# Patient Record
Sex: MALE | Race: WHITE | NOT HISPANIC OR LATINO | Employment: OTHER
[De-identification: names, ages, dates, MRNs, and addresses within clinical notes are randomized per-mention and may not be internally consistent; named-entity substitution may affect disease eponyms.]

---

## 2017-01-09 ENCOUNTER — PRIOR ORIGINAL RECORDS (OUTPATIENT)
Dept: OTHER | Age: 72
End: 2017-01-09

## 2017-06-01 ENCOUNTER — PRIOR ORIGINAL RECORDS (OUTPATIENT)
Dept: OTHER | Age: 72
End: 2017-06-01

## 2017-07-13 ENCOUNTER — HOSPITAL (OUTPATIENT)
Dept: OTHER | Age: 72
End: 2017-07-13
Attending: INTERNAL MEDICINE

## 2017-07-13 ENCOUNTER — CHARTING TRANS (OUTPATIENT)
Dept: OTHER | Age: 72
End: 2017-07-13

## 2017-07-13 LAB
ALBUMIN SERPL-MCNC: 3.5 GM/DL (ref 3.6–5.1)
ALBUMIN/GLOB SERPL: 0.7 {RATIO} (ref 1–2.4)
ALP SERPL-CCNC: 127 UNIT/L (ref 45–117)
ALT SERPL-CCNC: 45 UNIT/L
ANALYZER ANC (IANC): ABNORMAL
ANION GAP SERPL CALC-SCNC: 15 MMOL/L (ref 10–20)
AST SERPL-CCNC: 28 UNIT/L
BASOPHILS # BLD: 0 THOUSAND/MCL (ref 0–0.3)
BASOPHILS NFR BLD: 0 %
BILIRUB SERPL-MCNC: 0.7 MG/DL (ref 0.2–1)
BNP SERPL-MCNC: 193 PG/ML
BUN SERPL-MCNC: 24 MG/DL (ref 6–20)
BUN/CREAT SERPL: 29 (ref 7–25)
CALCIUM SERPL-MCNC: 9 MG/DL (ref 8.4–10.2)
CHLORIDE: 106 MMOL/L (ref 98–107)
CO2 SERPL-SCNC: 25 MMOL/L (ref 21–32)
CREAT SERPL-MCNC: 0.82 MG/DL (ref 0.67–1.17)
DIFFERENTIAL METHOD BLD: ABNORMAL
EOSINOPHIL # BLD: 0.1 THOUSAND/MCL (ref 0.1–0.5)
EOSINOPHIL NFR BLD: 1 %
ERYTHROCYTE [DISTWIDTH] IN BLOOD: 13.7 % (ref 11–15)
GLOBULIN SER-MCNC: 4.8 GM/DL (ref 2–4)
GLUCOSE SERPL-MCNC: 147 MG/DL (ref 65–99)
HEMATOCRIT: 39 % (ref 39–51)
HGB BLD-MCNC: 13.3 GM/DL (ref 13–17)
LIPASE SERPL-CCNC: 191 UNIT/L (ref 73–393)
LYMPHOCYTES # BLD: 0.9 THOUSAND/MCL (ref 1–4)
LYMPHOCYTES NFR BLD: 11 %
MCH RBC QN AUTO: 31.7 PG (ref 26–34)
MCHC RBC AUTO-ENTMCNC: 34.1 GM/DL (ref 32–36.5)
MCV RBC AUTO: 93.1 FL (ref 78–100)
MONOCYTES # BLD: 1.4 THOUSAND/MCL (ref 0.3–0.9)
MONOCYTES NFR BLD: 18 %
NEUTROPHILS # BLD: 5.7 THOUSAND/MCL (ref 1.8–7.7)
NEUTROPHILS NFR BLD: 70 %
NEUTS SEG NFR BLD: ABNORMAL %
PERCENT NRBC: ABNORMAL
PLATELET # BLD: 294 THOUSAND/MCL (ref 140–450)
POTASSIUM SERPL-SCNC: 3.6 MMOL/L (ref 3.4–5.1)
PROT SERPL-MCNC: 8.3 GM/DL (ref 6.4–8.2)
RBC # BLD: 4.19 MILLION/MCL (ref 4.5–5.9)
SODIUM SERPL-SCNC: 142 MMOL/L (ref 135–145)
TROPONIN I SERPL HS-MCNC: <0.02 NG/ML
WBC # BLD: 8.1 THOUSAND/MCL (ref 4.2–11)

## 2017-07-14 ENCOUNTER — CHARTING TRANS (OUTPATIENT)
Dept: OTHER | Age: 72
End: 2017-07-14

## 2017-07-14 ENCOUNTER — DIAGNOSTIC TRANS (OUTPATIENT)
Dept: OTHER | Age: 72
End: 2017-07-14

## 2017-07-14 LAB
ALBUMIN SERPL-MCNC: 2.6 GM/DL (ref 3.6–5.1)
ALBUMIN/GLOB SERPL: 0.7 {RATIO} (ref 1–2.4)
ALP SERPL-CCNC: 57 UNIT/L (ref 45–117)
ALT SERPL-CCNC: 29 UNIT/L
ANALYZER ANC (IANC): ABNORMAL
ANION GAP SERPL CALC-SCNC: 13 MMOL/L (ref 10–20)
AST SERPL-CCNC: 21 UNIT/L
BILIRUB SERPL-MCNC: 0.5 MG/DL (ref 0.2–1)
BUN SERPL-MCNC: 24 MG/DL (ref 6–20)
BUN/CREAT SERPL: 29 (ref 7–25)
CALCIUM SERPL-MCNC: 8 MG/DL (ref 8.4–10.2)
CHLORIDE: 110 MMOL/L (ref 98–107)
CHOLEST SERPL-MCNC: 169 MG/DL
CHOLEST/HDLC SERPL: 2.5 {RATIO}
CO2 SERPL-SCNC: 26 MMOL/L (ref 21–32)
CREAT SERPL-MCNC: 0.84 MG/DL (ref 0.67–1.17)
ERYTHROCYTE [DISTWIDTH] IN BLOOD: 13.9 % (ref 11–15)
GLOBULIN SER-MCNC: 3.8 GM/DL (ref 2–4)
GLUCOSE SERPL-MCNC: 93 MG/DL (ref 65–99)
HDLC SERPL-MCNC: 67 MG/DL
HEMATOCRIT: 32 % (ref 39–51)
HGB BLD-MCNC: 10.8 GM/DL (ref 13–17)
LDLC SERPL CALC-MCNC: 93 MG/DL
MCH RBC QN AUTO: 31.2 PG (ref 26–34)
MCHC RBC AUTO-ENTMCNC: 33.8 GM/DL (ref 32–36.5)
MCV RBC AUTO: 92.5 FL (ref 78–100)
NONHDLC SERPL-MCNC: 102 MG/DL
PLATELET # BLD: 234 THOUSAND/MCL (ref 140–450)
POTASSIUM SERPL-SCNC: 3.7 MMOL/L (ref 3.4–5.1)
PROT SERPL-MCNC: 6.4 GM/DL (ref 6.4–8.2)
RBC # BLD: 3.46 MILLION/MCL (ref 4.5–5.9)
SODIUM SERPL-SCNC: 145 MMOL/L (ref 135–145)
TRIGLYCERIDE (TRIGP): 44 MG/DL
WBC # BLD: 7.1 THOUSAND/MCL (ref 4.2–11)

## 2017-07-21 ENCOUNTER — PRIOR ORIGINAL RECORDS (OUTPATIENT)
Dept: OTHER | Age: 72
End: 2017-07-21

## 2017-12-04 ENCOUNTER — PRIOR ORIGINAL RECORDS (OUTPATIENT)
Dept: OTHER | Age: 72
End: 2017-12-04

## 2018-01-09 ENCOUNTER — PRIOR ORIGINAL RECORDS (OUTPATIENT)
Dept: OTHER | Age: 73
End: 2018-01-09

## 2018-01-11 ENCOUNTER — PRIOR ORIGINAL RECORDS (OUTPATIENT)
Dept: OTHER | Age: 73
End: 2018-01-11

## 2018-01-30 ENCOUNTER — PRIOR ORIGINAL RECORDS (OUTPATIENT)
Dept: OTHER | Age: 73
End: 2018-01-30

## 2018-04-11 ENCOUNTER — MYAURORA ACCOUNT LINK (OUTPATIENT)
Dept: OTHER | Age: 73
End: 2018-04-11

## 2018-04-11 ENCOUNTER — PRIOR ORIGINAL RECORDS (OUTPATIENT)
Dept: OTHER | Age: 73
End: 2018-04-11

## 2018-08-07 ENCOUNTER — MYAURORA ACCOUNT LINK (OUTPATIENT)
Dept: OTHER | Age: 73
End: 2018-08-07

## 2018-08-07 ENCOUNTER — PRIOR ORIGINAL RECORDS (OUTPATIENT)
Dept: OTHER | Age: 73
End: 2018-08-07

## 2019-02-28 VITALS
BODY MASS INDEX: 24.1 KG/M2 | SYSTOLIC BLOOD PRESSURE: 112 MMHG | HEART RATE: 64 BPM | WEIGHT: 159 LBS | HEIGHT: 68 IN | DIASTOLIC BLOOD PRESSURE: 80 MMHG | RESPIRATION RATE: 16 BRPM

## 2019-02-28 VITALS
DIASTOLIC BLOOD PRESSURE: 70 MMHG | SYSTOLIC BLOOD PRESSURE: 112 MMHG | WEIGHT: 162 LBS | BODY MASS INDEX: 24.55 KG/M2 | HEIGHT: 68 IN | HEART RATE: 68 BPM

## 2019-02-28 VITALS
BODY MASS INDEX: 24.25 KG/M2 | SYSTOLIC BLOOD PRESSURE: 112 MMHG | HEART RATE: 60 BPM | WEIGHT: 160 LBS | DIASTOLIC BLOOD PRESSURE: 70 MMHG | RESPIRATION RATE: 16 BRPM | HEIGHT: 68 IN

## 2019-03-01 VITALS
SYSTOLIC BLOOD PRESSURE: 124 MMHG | DIASTOLIC BLOOD PRESSURE: 82 MMHG | RESPIRATION RATE: 16 BRPM | WEIGHT: 160 LBS | BODY MASS INDEX: 24.25 KG/M2 | HEIGHT: 68 IN | HEART RATE: 76 BPM

## 2019-03-19 RX ORDER — RIVAROXABAN 20 MG/1
TABLET, FILM COATED ORAL
COMMUNITY
End: 2019-09-24 | Stop reason: SDUPTHER

## 2019-03-19 RX ORDER — CARVEDILOL 25 MG/1
TABLET ORAL
COMMUNITY
End: 2019-04-16 | Stop reason: SDUPTHER

## 2019-04-16 RX ORDER — CARVEDILOL 25 MG/1
25 TABLET ORAL 2 TIMES DAILY WITH MEALS
Qty: 180 TABLET | Refills: 2 | Status: SHIPPED | OUTPATIENT
Start: 2019-04-16 | End: 2019-04-18 | Stop reason: SDUPTHER

## 2019-04-17 ENCOUNTER — OFFICE VISIT (OUTPATIENT)
Dept: CARDIOLOGY | Age: 74
End: 2019-04-17

## 2019-04-17 VITALS
DIASTOLIC BLOOD PRESSURE: 76 MMHG | RESPIRATION RATE: 16 BRPM | HEIGHT: 69 IN | SYSTOLIC BLOOD PRESSURE: 120 MMHG | BODY MASS INDEX: 23.85 KG/M2 | WEIGHT: 161 LBS

## 2019-04-17 DIAGNOSIS — I77.1 CELIAC ARTERY STENOSIS (CMD): Primary | ICD-10-CM

## 2019-04-17 DIAGNOSIS — Z79.01 CHRONIC ANTICOAGULATION: ICD-10-CM

## 2019-04-17 PROCEDURE — 99213 OFFICE O/P EST LOW 20 MIN: CPT | Performed by: INTERNAL MEDICINE

## 2019-04-17 SDOH — HEALTH STABILITY: PHYSICAL HEALTH: ON AVERAGE, HOW MANY DAYS PER WEEK DO YOU ENGAGE IN MODERATE TO STRENUOUS EXERCISE (LIKE A BRISK WALK)?: 0 DAYS

## 2019-04-17 SDOH — HEALTH STABILITY: PHYSICAL HEALTH: ON AVERAGE, HOW MANY MINUTES DO YOU ENGAGE IN EXERCISE AT THIS LEVEL?: 0 MIN

## 2019-04-17 ASSESSMENT — PATIENT HEALTH QUESTIONNAIRE - PHQ9
1. LITTLE INTEREST OR PLEASURE IN DOING THINGS: NOT AT ALL
2. FEELING DOWN, DEPRESSED OR HOPELESS: NOT AT ALL
SUM OF ALL RESPONSES TO PHQ9 QUESTIONS 1 AND 2: 0
SUM OF ALL RESPONSES TO PHQ9 QUESTIONS 1 AND 2: 0

## 2019-04-17 ASSESSMENT — ENCOUNTER SYMPTOMS
COUGH: 0
BRUISES/BLEEDS EASILY: 0
ALLERGIC/IMMUNOLOGIC COMMENTS: NO NEW FOOD ALLERGIES
CHILLS: 0
WEIGHT GAIN: 0
BACK PAIN: 1
HEMOPTYSIS: 0
FEVER: 0
WEIGHT LOSS: 0
SUSPICIOUS LESIONS: 0
HEMATOCHEZIA: 0

## 2019-04-18 RX ORDER — CARVEDILOL 25 MG/1
25 TABLET ORAL 2 TIMES DAILY WITH MEALS
Qty: 180 TABLET | Refills: 0 | Status: SHIPPED | OUTPATIENT
Start: 2019-04-18 | End: 2019-12-19 | Stop reason: SDUPTHER

## 2019-05-11 PROBLEM — I48.20 CHRONIC ATRIAL FIBRILLATION (CMD): Status: ACTIVE | Noted: 2017-01-09

## 2019-05-11 PROBLEM — I77.4 CELIAC ARTERY STENOSIS (CMD): Status: ACTIVE | Noted: 2019-05-11

## 2019-05-11 PROBLEM — I73.89 OTHER SPECIFIED PERIPHERAL VASCULAR DISEASES (CMD): Status: ACTIVE | Noted: 2017-12-04

## 2019-05-11 PROBLEM — I77.1 CELIAC ARTERY STENOSIS (CMD): Status: ACTIVE | Noted: 2019-05-11

## 2019-08-22 ENCOUNTER — OFFICE VISIT (OUTPATIENT)
Dept: CARDIOLOGY | Age: 74
End: 2019-08-22

## 2019-08-22 VITALS
HEIGHT: 68 IN | SYSTOLIC BLOOD PRESSURE: 118 MMHG | BODY MASS INDEX: 23.64 KG/M2 | HEART RATE: 76 BPM | WEIGHT: 156 LBS | DIASTOLIC BLOOD PRESSURE: 82 MMHG

## 2019-08-22 DIAGNOSIS — E78.2 HYPERLIPIDEMIA, MIXED: ICD-10-CM

## 2019-08-22 DIAGNOSIS — I45.10 RBBB: ICD-10-CM

## 2019-08-22 DIAGNOSIS — I35.1 MILD AORTIC REGURGITATION: ICD-10-CM

## 2019-08-22 DIAGNOSIS — I73.89 OTHER SPECIFIED PERIPHERAL VASCULAR DISEASES (CMD): ICD-10-CM

## 2019-08-22 DIAGNOSIS — I10 ESSENTIAL HYPERTENSION: ICD-10-CM

## 2019-08-22 DIAGNOSIS — I77.1 CELIAC ARTERY STENOSIS (CMD): ICD-10-CM

## 2019-08-22 DIAGNOSIS — Z79.01 LONG TERM (CURRENT) USE OF ANTICOAGULANTS: ICD-10-CM

## 2019-08-22 DIAGNOSIS — I34.0 MILD MITRAL REGURGITATION: ICD-10-CM

## 2019-08-22 DIAGNOSIS — I48.20 CHRONIC ATRIAL FIBRILLATION (CMD): Primary | ICD-10-CM

## 2019-08-22 DIAGNOSIS — R79.82 ELEVATED C-REACTIVE PROTEIN (CRP): ICD-10-CM

## 2019-08-22 PROBLEM — I73.9 SMALL VESSEL DISEASE (HCC): Status: ACTIVE | Noted: 2019-08-22

## 2019-08-22 PROBLEM — R26.9 GAIT ABNORMALITY: Status: ACTIVE | Noted: 2019-08-22

## 2019-08-22 PROBLEM — G95.89 MYELOMALACIA (HCC): Status: ACTIVE | Noted: 2019-08-22

## 2019-08-22 PROBLEM — M48.02 CERVICAL STENOSIS OF SPINAL CANAL: Status: ACTIVE | Noted: 2019-08-22

## 2019-08-22 PROCEDURE — 3079F DIAST BP 80-89 MM HG: CPT | Performed by: INTERNAL MEDICINE

## 2019-08-22 PROCEDURE — 3074F SYST BP LT 130 MM HG: CPT | Performed by: INTERNAL MEDICINE

## 2019-08-22 PROCEDURE — 99214 OFFICE O/P EST MOD 30 MIN: CPT | Performed by: INTERNAL MEDICINE

## 2019-08-22 SDOH — HEALTH STABILITY: PHYSICAL HEALTH: ON AVERAGE, HOW MANY MINUTES DO YOU ENGAGE IN EXERCISE AT THIS LEVEL?: PATIENT DECLINED

## 2019-08-22 SDOH — HEALTH STABILITY: PHYSICAL HEALTH
ON AVERAGE, HOW MANY DAYS PER WEEK DO YOU ENGAGE IN MODERATE TO STRENUOUS EXERCISE (LIKE A BRISK WALK)?: PATIENT DECLINED

## 2019-08-22 ASSESSMENT — ENCOUNTER SYMPTOMS
BRUISES/BLEEDS EASILY: 0
HEMOPTYSIS: 0
WEIGHT GAIN: 0
CHILLS: 0
SUSPICIOUS LESIONS: 0
FEVER: 0
HEMATOCHEZIA: 0
ALLERGIC/IMMUNOLOGIC COMMENTS: NO NEW FOOD ALLERGIES
COUGH: 0
WEIGHT LOSS: 0

## 2019-09-09 ENCOUNTER — TELEPHONE (OUTPATIENT)
Dept: CARDIOLOGY | Age: 74
End: 2019-09-09

## 2019-09-10 ENCOUNTER — ANCILLARY PROCEDURE (OUTPATIENT)
Dept: CARDIOLOGY | Age: 74
End: 2019-09-10
Attending: INTERNAL MEDICINE

## 2019-09-10 VITALS — WEIGHT: 156 LBS | BODY MASS INDEX: 23.64 KG/M2 | HEIGHT: 68 IN

## 2019-09-10 DIAGNOSIS — I48.20 CHRONIC ATRIAL FIBRILLATION (CMD): ICD-10-CM

## 2019-09-10 DIAGNOSIS — R79.82 ELEVATED C-REACTIVE PROTEIN (CRP): ICD-10-CM

## 2019-09-10 LAB
HEART RATE RESERVE PREDICTED: 27.7 BPM
LV EF: 51 %
PEAK HR ACHIEVED: 107 BPM
RESTING HR ACHIEVED: 75 BPM
STRESS BASELINE BP: NORMAL MMHG
STRESS PERCENT HR: 72 %
STRESS POST EXERCISE DUR MIN: 4 MIN
STRESS POST EXERCISE DUR SEC: 5 SEC
STRESS POST PEAK BP: NORMAL MMHG
STRESS TARGET HR: 148 BPM

## 2019-09-10 PROCEDURE — 93015 CV STRESS TEST SUPVJ I&R: CPT | Performed by: INTERNAL MEDICINE

## 2019-09-10 PROCEDURE — A9502 TC99M TETROFOSMIN: HCPCS | Performed by: INTERNAL MEDICINE

## 2019-09-10 PROCEDURE — 78452 HT MUSCLE IMAGE SPECT MULT: CPT | Performed by: INTERNAL MEDICINE

## 2019-09-10 RX ORDER — REGADENOSON 0.08 MG/ML
0.4 INJECTION, SOLUTION INTRAVENOUS ONCE
Status: COMPLETED | OUTPATIENT
Start: 2019-09-10 | End: 2019-09-10

## 2019-09-10 RX ADMIN — REGADENOSON 0.4 MG: 0.08 INJECTION, SOLUTION INTRAVENOUS at 14:05

## 2019-09-10 ASSESSMENT — EXERCISE STRESS TEST
PEAK_HR: 83
STAGE_CATEGORIES: RECOVERY 1
PEAK_HR: 107
STAGE_CATEGORIES: RESTING
PEAK_BP: 120/70
PEAK_HR: 107
PEAK_BP: 128/78
PEAK_RPP: 11264
STAGE_CATEGORIES: RECOVERY 2
PEAK_HR: 109
PEAK_HR: 75
PEAK_BP: 114/70
PEAK_HR: 88
STAGE_CATEGORIES: RECOVERY 0
PEAK_RPP: 13080
PEAK_BP: 114/70
PEAK_RPP: 9150
STAGE_CATEGORIES: 2
PEAK_BP: 124/66
STAGE_CATEGORIES: 1
PEAK_RPP: 12198
PEAK_BP: 122/78
PEAK_RPP: 10292
PEAK_RPP: 12198

## 2019-09-11 ENCOUNTER — TELEPHONE (OUTPATIENT)
Dept: CARDIOLOGY | Age: 74
End: 2019-09-11

## 2019-09-12 ENCOUNTER — TELEPHONE (OUTPATIENT)
Dept: CARDIOLOGY | Age: 74
End: 2019-09-12

## 2019-09-26 RX ORDER — RIVAROXABAN 20 MG/1
20 TABLET, FILM COATED ORAL
Qty: 90 TABLET | Refills: 3 | Status: SHIPPED | OUTPATIENT
Start: 2019-09-26 | End: 2021-03-16 | Stop reason: SDUPTHER

## 2019-12-17 LAB
ALT SERPL-CCNC: 26 U/L
AST SERPL-CCNC: 36 U/L
CHOLEST SERPL-MCNC: 206 MG/DL
CHOLEST/HDLC SERPL: NORMAL {RATIO}
HDLC SERPL-MCNC: 47 MG/DL
LDLC SERPL CALC-MCNC: 146 MG/DL
LENGTH OF FAST TIME PATIENT: NORMAL H
NONHDLC SERPL-MCNC: NORMAL MG/DL
TRIGL SERPL-MCNC: 67 MG/DL
VLDLC SERPL CALC-MCNC: 13 MG/DL

## 2019-12-18 ENCOUNTER — TELEPHONE (OUTPATIENT)
Dept: CARDIOLOGY | Age: 74
End: 2019-12-18

## 2019-12-18 ENCOUNTER — CLINICAL ABSTRACT (OUTPATIENT)
Dept: CARDIOLOGY | Age: 74
End: 2019-12-18

## 2019-12-18 DIAGNOSIS — E78.5 DYSLIPIDEMIA: Primary | ICD-10-CM

## 2019-12-19 RX ORDER — CARVEDILOL 25 MG/1
25 TABLET ORAL 2 TIMES DAILY WITH MEALS
Qty: 180 TABLET | Refills: 1 | Status: SHIPPED | OUTPATIENT
Start: 2019-12-19 | End: 2020-09-14 | Stop reason: SDUPTHER

## 2020-04-22 ENCOUNTER — APPOINTMENT (OUTPATIENT)
Dept: CARDIOLOGY | Age: 75
End: 2020-04-22

## 2020-04-24 ENCOUNTER — OFFICE VISIT (OUTPATIENT)
Dept: CARDIOLOGY | Age: 75
End: 2020-04-24

## 2020-04-24 DIAGNOSIS — I48.20 CHRONIC ATRIAL FIBRILLATION (CMD): Primary | ICD-10-CM

## 2020-04-24 DIAGNOSIS — I77.1 CELIAC ARTERY STENOSIS (CMD): ICD-10-CM

## 2020-04-24 DIAGNOSIS — I73.89 OTHER SPECIFIED PERIPHERAL VASCULAR DISEASES (CMD): ICD-10-CM

## 2020-04-24 DIAGNOSIS — E78.5 DYSLIPIDEMIA: ICD-10-CM

## 2020-04-24 DIAGNOSIS — I10 ESSENTIAL HYPERTENSION: ICD-10-CM

## 2020-04-24 DIAGNOSIS — I34.0 MILD MITRAL REGURGITATION: ICD-10-CM

## 2020-07-02 LAB
25(OH)D3+25(OH)D2 SERPL-MCNC: 60.3 NG/ML
ALBUMIN SERPL-MCNC: 4.3 G/DL
ALBUMIN/GLOB SERPL: 1.7 {RATIO}
ALP SERPL-CCNC: 75 U/L
ALT SERPL-CCNC: 31 UNITS/L
AST SERPL-CCNC: 36 UNITS/L
BILIRUB SERPL-MCNC: 0.4 MG/DL
BUN SERPL-MCNC: 15 MG/DL
BUN/CREAT SERPL: 14
CALCIUM SERPL-MCNC: 9.2 MG/DL
CHLORIDE SERPL-SCNC: 101 MMOL/L
CHOLEST SERPL-MCNC: 216 MG/DL
CHOLEST/HDLC SERPL: 4.1 {RATIO}
CO2 SERPL-SCNC: 22 MMOL/L
CREAT SERPL-MCNC: 1.07 MG/DL
ERYTHROCYTE [DISTWIDTH] IN BLOOD: 12.9 %
GLOBULIN SER-MCNC: 2.5 G/DL
GLUCOSE SERPL-MCNC: 94 MG/DL
HCT VFR BLD CALC: 35.3 %
HDLC SERPL-MCNC: 53 MG/DL
HGB BLD-MCNC: 11.7 G/DL
LDLC SERPL CALC-MCNC: 151 MG/DL
MCH RBC QN AUTO: 30.8 PG
MCHC RBC AUTO-ENTMCNC: 33.1 G/DL
MCV RBC AUTO: 93 FL
PLATELET # BLD: 270 K/MCL
POTASSIUM SERPL-SCNC: 4.6 MMOL/L
PROT SERPL-MCNC: 6.8 G/DL
RBC # BLD: 3.8 10*6/UL
SODIUM SERPL-SCNC: 139 MMOL/L
TRIGL SERPL-MCNC: 62 MG/DL
VLDLC SERPL CALC-MCNC: 12 MG/DL
WBC # BLD: 5.6 K/MCL

## 2020-08-20 ENCOUNTER — OFFICE VISIT (OUTPATIENT)
Dept: CARDIOLOGY | Age: 75
End: 2020-08-20

## 2020-08-20 VITALS
HEIGHT: 68 IN | SYSTOLIC BLOOD PRESSURE: 120 MMHG | DIASTOLIC BLOOD PRESSURE: 62 MMHG | BODY MASS INDEX: 24.55 KG/M2 | WEIGHT: 162 LBS | HEART RATE: 76 BPM

## 2020-08-20 DIAGNOSIS — I48.20 CHRONIC ATRIAL FIBRILLATION (CMD): Primary | ICD-10-CM

## 2020-08-20 DIAGNOSIS — I73.89 OTHER SPECIFIED PERIPHERAL VASCULAR DISEASES (CMD): ICD-10-CM

## 2020-08-20 DIAGNOSIS — I73.9 PERIPHERAL VASCULAR DISEASE (CMD): ICD-10-CM

## 2020-08-20 DIAGNOSIS — I10 ESSENTIAL HYPERTENSION: ICD-10-CM

## 2020-08-20 DIAGNOSIS — I45.10 RBBB: ICD-10-CM

## 2020-08-20 DIAGNOSIS — I35.1 MILD AORTIC REGURGITATION: ICD-10-CM

## 2020-08-20 DIAGNOSIS — Z79.01 LONG TERM (CURRENT) USE OF ANTICOAGULANTS: ICD-10-CM

## 2020-08-20 DIAGNOSIS — I34.0 MILD MITRAL REGURGITATION: ICD-10-CM

## 2020-08-20 DIAGNOSIS — E78.5 DYSLIPIDEMIA: ICD-10-CM

## 2020-08-20 DIAGNOSIS — I77.1 CELIAC ARTERY STENOSIS (CMD): ICD-10-CM

## 2020-08-20 PROCEDURE — 3078F DIAST BP <80 MM HG: CPT | Performed by: INTERNAL MEDICINE

## 2020-08-20 PROCEDURE — 3074F SYST BP LT 130 MM HG: CPT | Performed by: INTERNAL MEDICINE

## 2020-08-20 PROCEDURE — 99214 OFFICE O/P EST MOD 30 MIN: CPT | Performed by: INTERNAL MEDICINE

## 2020-08-20 SDOH — HEALTH STABILITY: PHYSICAL HEALTH: ON AVERAGE, HOW MANY DAYS PER WEEK DO YOU ENGAGE IN MODERATE TO STRENUOUS EXERCISE (LIKE A BRISK WALK)?: 4 DAYS

## 2020-08-20 SDOH — HEALTH STABILITY: PHYSICAL HEALTH: ON AVERAGE, HOW MANY MINUTES DO YOU ENGAGE IN EXERCISE AT THIS LEVEL?: 60 MIN

## 2020-08-20 ASSESSMENT — ENCOUNTER SYMPTOMS
HEMOPTYSIS: 0
DEPRESSION: 0
BLURRED VISION: 0
FOCAL WEAKNESS: 0
CHILLS: 0
ALLERGIC/IMMUNOLOGIC COMMENTS: NO NEW FOOD ALLERGIES
WEIGHT LOSS: 0
SHORTNESS OF BREATH: 0
FEVER: 0
WEIGHT GAIN: 0
HEMATOCHEZIA: 0
BRUISES/BLEEDS EASILY: 0
SUSPICIOUS LESIONS: 0
COUGH: 0
NEAR-SYNCOPE: 0
SYNCOPE: 0

## 2020-08-20 ASSESSMENT — PATIENT HEALTH QUESTIONNAIRE - PHQ9
SUM OF ALL RESPONSES TO PHQ9 QUESTIONS 1 AND 2: 0
SUM OF ALL RESPONSES TO PHQ9 QUESTIONS 1 AND 2: 0
CLINICAL INTERPRETATION OF PHQ2 SCORE: NO FURTHER SCREENING NEEDED
CLINICAL INTERPRETATION OF PHQ9 SCORE: NO FURTHER SCREENING NEEDED
2. FEELING DOWN, DEPRESSED OR HOPELESS: NOT AT ALL
1. LITTLE INTEREST OR PLEASURE IN DOING THINGS: NOT AT ALL

## 2020-08-21 ENCOUNTER — CLINICAL ABSTRACT (OUTPATIENT)
Dept: CARDIOLOGY | Age: 75
End: 2020-08-21

## 2020-09-15 RX ORDER — CARVEDILOL 25 MG/1
25 TABLET ORAL 2 TIMES DAILY WITH MEALS
Qty: 180 TABLET | Refills: 3 | Status: SHIPPED | OUTPATIENT
Start: 2020-09-15 | End: 2021-09-16

## 2021-01-01 ENCOUNTER — EXTERNAL RECORD (OUTPATIENT)
Dept: OTHER | Age: 76
End: 2021-01-01

## 2021-01-26 ENCOUNTER — TELEPHONE (OUTPATIENT)
Dept: CARDIOLOGY | Age: 76
End: 2021-01-26

## 2021-02-03 ENCOUNTER — TELEPHONE (OUTPATIENT)
Dept: CARDIOLOGY | Age: 76
End: 2021-02-03

## 2021-02-03 ENCOUNTER — IMAGING SERVICES (OUTPATIENT)
Dept: CARDIOLOGY | Age: 76
End: 2021-02-03
Attending: INTERNAL MEDICINE

## 2021-02-03 ENCOUNTER — ANCILLARY PROCEDURE (OUTPATIENT)
Dept: CARDIOLOGY | Age: 76
End: 2021-02-03
Attending: INTERNAL MEDICINE

## 2021-02-03 DIAGNOSIS — I70.0 AORTIC ATHEROSCLEROSIS (CMD): ICD-10-CM

## 2021-02-03 DIAGNOSIS — I73.9 PERIPHERAL VASCULAR DISEASE (CMD): ICD-10-CM

## 2021-02-11 ENCOUNTER — DOCUMENTATION (OUTPATIENT)
Dept: CARDIOLOGY | Age: 76
End: 2021-02-11

## 2021-02-26 ENCOUNTER — APPOINTMENT (OUTPATIENT)
Dept: CARDIOLOGY | Age: 76
End: 2021-02-26

## 2021-02-26 ENCOUNTER — DOCUMENTATION (OUTPATIENT)
Dept: CARDIOLOGY | Age: 76
End: 2021-02-26

## 2021-03-15 DIAGNOSIS — Z23 NEED FOR VACCINATION: ICD-10-CM

## 2021-03-16 ENCOUNTER — TELEPHONE (OUTPATIENT)
Dept: CARDIOLOGY | Age: 76
End: 2021-03-16

## 2021-03-16 RX ORDER — RIVAROXABAN 20 MG/1
20 TABLET, FILM COATED ORAL
Qty: 90 TABLET | Refills: 3 | Status: SHIPPED | OUTPATIENT
Start: 2021-03-16 | End: 2022-03-21

## 2021-03-16 RX ORDER — RIVAROXABAN 20 MG/1
TABLET, FILM COATED ORAL
Qty: 90 TABLET | Refills: 3 | OUTPATIENT
Start: 2021-03-16

## 2021-03-18 ENCOUNTER — IMMUNIZATION (OUTPATIENT)
Dept: LAB | Age: 76
End: 2021-03-18

## 2021-03-18 DIAGNOSIS — Z23 NEED FOR VACCINATION: Primary | ICD-10-CM

## 2021-03-18 PROCEDURE — 91300 COVID 19 PFIZER-BIONTECH: CPT

## 2021-03-18 PROCEDURE — 0001A COVID 19 PFIZER-BIONTECH: CPT

## 2021-04-12 ENCOUNTER — IMMUNIZATION (OUTPATIENT)
Dept: LAB | Age: 76
End: 2021-04-12
Attending: HOSPITALIST

## 2021-04-12 DIAGNOSIS — Z23 NEED FOR VACCINATION: Primary | ICD-10-CM

## 2021-04-12 PROCEDURE — 0002A COVID 19 PFIZER-BIONTECH: CPT | Performed by: HOSPITALIST

## 2021-04-12 PROCEDURE — 91300 COVID 19 PFIZER-BIONTECH: CPT | Performed by: HOSPITALIST

## 2021-04-15 ENCOUNTER — DOCUMENTATION (OUTPATIENT)
Dept: CARDIOLOGY | Age: 76
End: 2021-04-15

## 2021-04-30 ENCOUNTER — TELEPHONE (OUTPATIENT)
Dept: CARDIOLOGY | Age: 76
End: 2021-04-30

## 2021-05-14 ENCOUNTER — TELEPHONE (OUTPATIENT)
Dept: CARDIOLOGY | Age: 76
End: 2021-05-14

## 2021-05-17 ENCOUNTER — DOCUMENTATION (OUTPATIENT)
Dept: CARDIOLOGY | Age: 76
End: 2021-05-17

## 2021-05-22 ENCOUNTER — LAB ENCOUNTER (OUTPATIENT)
Dept: LAB | Age: 76
End: 2021-05-22
Attending: ORTHOPAEDIC SURGERY
Payer: MEDICARE

## 2021-05-22 DIAGNOSIS — Z01.818 PRE-OP TESTING: ICD-10-CM

## 2021-05-22 PROCEDURE — 86850 RBC ANTIBODY SCREEN: CPT

## 2021-05-22 PROCEDURE — 86900 BLOOD TYPING SEROLOGIC ABO: CPT

## 2021-05-22 PROCEDURE — 86901 BLOOD TYPING SEROLOGIC RH(D): CPT

## 2021-05-23 ENCOUNTER — HOSPITAL ENCOUNTER (OUTPATIENT)
Facility: HOSPITAL | Age: 76
Setting detail: OBSERVATION
Discharge: HOME | End: 2021-05-25
Attending: EMERGENCY MEDICINE | Admitting: INTERNAL MEDICINE
Payer: MEDICARE

## 2021-05-23 DIAGNOSIS — M48.061 SPINAL STENOSIS OF LUMBAR REGION, UNSPECIFIED WHETHER NEUROGENIC CLAUDICATION PRESENT: Primary | ICD-10-CM

## 2021-05-23 PROCEDURE — 99285 EMERGENCY DEPT VISIT HI MDM: CPT

## 2021-05-23 PROCEDURE — 85025 COMPLETE CBC W/AUTO DIFF WBC: CPT | Performed by: EMERGENCY MEDICINE

## 2021-05-23 PROCEDURE — 87641 MR-STAPH DNA AMP PROBE: CPT | Performed by: EMERGENCY MEDICINE

## 2021-05-23 PROCEDURE — 36415 COLL VENOUS BLD VENIPUNCTURE: CPT

## 2021-05-23 PROCEDURE — 80048 BASIC METABOLIC PNL TOTAL CA: CPT | Performed by: EMERGENCY MEDICINE

## 2021-05-23 RX ORDER — BACLOFEN 10 MG/1
10 TABLET ORAL 2 TIMES DAILY
COMMUNITY

## 2021-05-23 RX ORDER — HYDROCODONE BITARTRATE AND ACETAMINOPHEN 10; 325 MG/1; MG/1
1 TABLET ORAL EVERY 6 HOURS PRN
Status: DISCONTINUED | OUTPATIENT
Start: 2021-05-23 | End: 2021-05-25

## 2021-05-23 RX ORDER — ACETAMINOPHEN 325 MG/1
650 TABLET ORAL EVERY 6 HOURS PRN
Status: DISCONTINUED | OUTPATIENT
Start: 2021-05-23 | End: 2021-05-25

## 2021-05-23 RX ORDER — MORPHINE SULFATE 2 MG/ML
2 INJECTION, SOLUTION INTRAMUSCULAR; INTRAVENOUS EVERY 2 HOUR PRN
Status: DISCONTINUED | OUTPATIENT
Start: 2021-05-23 | End: 2021-05-25

## 2021-05-23 RX ORDER — ONDANSETRON 2 MG/ML
4 INJECTION INTRAMUSCULAR; INTRAVENOUS EVERY 6 HOURS PRN
Status: DISCONTINUED | OUTPATIENT
Start: 2021-05-23 | End: 2021-05-25

## 2021-05-23 RX ORDER — CARVEDILOL 12.5 MG/1
12.5 TABLET ORAL 2 TIMES DAILY WITH MEALS
Status: DISCONTINUED | OUTPATIENT
Start: 2021-05-23 | End: 2021-05-25

## 2021-05-23 RX ORDER — MORPHINE SULFATE 2 MG/ML
1 INJECTION, SOLUTION INTRAMUSCULAR; INTRAVENOUS EVERY 2 HOUR PRN
Status: DISCONTINUED | OUTPATIENT
Start: 2021-05-23 | End: 2021-05-25

## 2021-05-23 RX ORDER — HEPARIN SODIUM 5000 [USP'U]/ML
5000 INJECTION, SOLUTION INTRAVENOUS; SUBCUTANEOUS EVERY 8 HOURS SCHEDULED
Status: DISCONTINUED | OUTPATIENT
Start: 2021-05-23 | End: 2021-05-25

## 2021-05-23 RX ORDER — MORPHINE SULFATE 4 MG/ML
4 INJECTION, SOLUTION INTRAMUSCULAR; INTRAVENOUS EVERY 2 HOUR PRN
Status: DISCONTINUED | OUTPATIENT
Start: 2021-05-23 | End: 2021-05-25

## 2021-05-23 RX ORDER — METOCLOPRAMIDE HYDROCHLORIDE 5 MG/ML
10 INJECTION INTRAMUSCULAR; INTRAVENOUS EVERY 8 HOURS PRN
Status: DISCONTINUED | OUTPATIENT
Start: 2021-05-23 | End: 2021-05-25

## 2021-05-23 NOTE — ED INITIAL ASSESSMENT (HPI)
Chronic lower back pain. Sent by  for pain control and admission. Scheduled to have surgery mariam and  wanted him admitted for intractable pain. Taking norco at home with no improvement. Last took norco at 0500.

## 2021-05-23 NOTE — ED QUICK NOTES
Orders for admission, patient is aware of plan and ready to go upstairs. Any questions, please call ED RICARDO hilliard at ext [phone]. Patient from home. A/Ox3.     Rapid Covid negative    No suspicion for covid

## 2021-05-23 NOTE — ED PROVIDER NOTES
Patient Seen in: [facility] AND [facility] Emergency Department    History   Patient presents with:  Back Pain      HPI    The patient presents to the ED complaining of chronic low back pain and history of spinal fusion in the past.  He states that over the past Comment: medical      ROS  Pertinent positives: Low back pain  All other organ systems are reviewed and are negative. Constitutional and vital signs reviewed.       Social History and Family History elements reviewed from today, pertinent positives to th using a walker   Psychiatric:         Mood and Affect: Mood normal.         Behavior: Behavior normal.         ED Course        Labs Reviewed   BASIC METABOLIC PANEL (8) - Abnormal; Notable for the following components:       Result Value    Sodium 130 (*) complexity of this ED evaluation. ED Course: Patient presents to the ED for progressive low back pain over months. Scheduled for surgery tomorrow with Dr. Gayle Macedo. I discussed with Dr. Gayle Macedo and plan for admission with surgery tomorrow.   Discussed w

## 2021-05-24 ENCOUNTER — ANESTHESIA EVENT (OUTPATIENT)
Dept: SURGERY | Facility: HOSPITAL | Age: 76
End: 2021-05-24
Payer: MEDICARE

## 2021-05-24 ENCOUNTER — APPOINTMENT (OUTPATIENT)
Dept: GENERAL RADIOLOGY | Facility: HOSPITAL | Age: 76
End: 2021-05-24
Attending: ORTHOPAEDIC SURGERY
Payer: MEDICARE

## 2021-05-24 ENCOUNTER — ANESTHESIA (OUTPATIENT)
Dept: SURGERY | Facility: HOSPITAL | Age: 76
End: 2021-05-24
Payer: MEDICARE

## 2021-05-24 PROBLEM — Z01.818 PREOP TESTING: Status: ACTIVE | Noted: 2021-05-24

## 2021-05-24 PROCEDURE — 95860 NEEDLE EMG 1 EXTREMITY: CPT | Performed by: ORTHOPAEDIC SURGERY

## 2021-05-24 PROCEDURE — 85610 PROTHROMBIN TIME: CPT | Performed by: INTERNAL MEDICINE

## 2021-05-24 PROCEDURE — 0ST20ZZ RESECTION OF LUMBAR VERTEBRAL DISC, OPEN APPROACH: ICD-10-PCS | Performed by: ORTHOPAEDIC SURGERY

## 2021-05-24 PROCEDURE — 00NY0ZZ RELEASE LUMBAR SPINAL CORD, OPEN APPROACH: ICD-10-PCS | Performed by: ORTHOPAEDIC SURGERY

## 2021-05-24 PROCEDURE — 76000 FLUOROSCOPY <1 HR PHYS/QHP: CPT | Performed by: ORTHOPAEDIC SURGERY

## 2021-05-24 PROCEDURE — 0SG00AJ FUSION OF LUMBAR VERTEBRAL JOINT WITH INTERBODY FUSION DEVICE, POSTERIOR APPROACH, ANTERIOR COLUMN, OPEN APPROACH: ICD-10-PCS | Performed by: ORTHOPAEDIC SURGERY

## 2021-05-24 PROCEDURE — 0SG0071 FUSION OF LUMBAR VERTEBRAL JOINT WITH AUTOLOGOUS TISSUE SUBSTITUTE, POSTERIOR APPROACH, POSTERIOR COLUMN, OPEN APPROACH: ICD-10-PCS | Performed by: ORTHOPAEDIC SURGERY

## 2021-05-24 PROCEDURE — 80048 BASIC METABOLIC PNL TOTAL CA: CPT | Performed by: INTERNAL MEDICINE

## 2021-05-24 PROCEDURE — 95938 SOMATOSENSORY TESTING: CPT | Performed by: ORTHOPAEDIC SURGERY

## 2021-05-24 PROCEDURE — 85025 COMPLETE CBC W/AUTO DIFF WBC: CPT | Performed by: INTERNAL MEDICINE

## 2021-05-24 PROCEDURE — 4A11X4G MONITORING OF PERIPHERAL NERVOUS ELECTRICAL ACTIVITY, INTRAOPERATIVE, EXTERNAL APPROACH: ICD-10-PCS | Performed by: ORTHOPAEDIC SURGERY

## 2021-05-24 DEVICE — SET SCREW
Type: IMPLANTABLE DEVICE | Site: BACK | Status: FUNCTIONAL
Brand: MARINER

## 2021-05-24 DEVICE — OSTEOCEL PRO LARGE BULK BUY: Type: IMPLANTABLE DEVICE | Site: BACK | Status: FUNCTIONAL

## 2021-05-24 DEVICE — GRAFT BONE OSTEOSURGE300C 10CC: Type: IMPLANTABLE DEVICE | Site: BACK | Status: FUNCTIONAL

## 2021-05-24 RX ORDER — SCOLOPAMINE TRANSDERMAL SYSTEM 1 MG/1
1 PATCH, EXTENDED RELEASE TRANSDERMAL ONCE
Status: DISCONTINUED | OUTPATIENT
Start: 2021-05-24 | End: 2021-05-24

## 2021-05-24 RX ORDER — MORPHINE SULFATE 10 MG/ML
6 INJECTION, SOLUTION INTRAMUSCULAR; INTRAVENOUS EVERY 10 MIN PRN
Status: DISCONTINUED | OUTPATIENT
Start: 2021-05-24 | End: 2021-05-24 | Stop reason: HOSPADM

## 2021-05-24 RX ORDER — BUPIVACAINE HYDROCHLORIDE AND EPINEPHRINE 5; 5 MG/ML; UG/ML
INJECTION, SOLUTION PERINEURAL AS NEEDED
Status: DISCONTINUED | OUTPATIENT
Start: 2021-05-24 | End: 2021-05-24 | Stop reason: HOSPADM

## 2021-05-24 RX ORDER — BISACODYL 10 MG
10 SUPPOSITORY, RECTAL RECTAL
Status: DISCONTINUED | OUTPATIENT
Start: 2021-05-24 | End: 2021-05-25

## 2021-05-24 RX ORDER — METOPROLOL TARTRATE 5 MG/5ML
2.5 INJECTION INTRAVENOUS ONCE
Status: DISCONTINUED | OUTPATIENT
Start: 2021-05-24 | End: 2021-05-24 | Stop reason: HOSPADM

## 2021-05-24 RX ORDER — HYDROMORPHONE HYDROCHLORIDE 1 MG/ML
0.6 INJECTION, SOLUTION INTRAMUSCULAR; INTRAVENOUS; SUBCUTANEOUS EVERY 5 MIN PRN
Status: DISCONTINUED | OUTPATIENT
Start: 2021-05-24 | End: 2021-05-24 | Stop reason: HOSPADM

## 2021-05-24 RX ORDER — HYDROMORPHONE HYDROCHLORIDE 1 MG/ML
0.5 INJECTION, SOLUTION INTRAMUSCULAR; INTRAVENOUS; SUBCUTANEOUS EVERY 2 HOUR PRN
Status: DISCONTINUED | OUTPATIENT
Start: 2021-05-24 | End: 2021-05-25

## 2021-05-24 RX ORDER — NALOXONE HYDROCHLORIDE 0.4 MG/ML
80 INJECTION, SOLUTION INTRAMUSCULAR; INTRAVENOUS; SUBCUTANEOUS AS NEEDED
Status: DISCONTINUED | OUTPATIENT
Start: 2021-05-24 | End: 2021-05-24 | Stop reason: HOSPADM

## 2021-05-24 RX ORDER — HYDROCODONE BITARTRATE AND ACETAMINOPHEN 5; 325 MG/1; MG/1
2 TABLET ORAL AS NEEDED
Status: DISCONTINUED | OUTPATIENT
Start: 2021-05-24 | End: 2021-05-24 | Stop reason: HOSPADM

## 2021-05-24 RX ORDER — ONDANSETRON 2 MG/ML
4 INJECTION INTRAMUSCULAR; INTRAVENOUS EVERY 4 HOURS PRN
Status: ACTIVE | OUTPATIENT
Start: 2021-05-24 | End: 2021-05-24

## 2021-05-24 RX ORDER — HYDROMORPHONE HYDROCHLORIDE 1 MG/ML
0.4 INJECTION, SOLUTION INTRAMUSCULAR; INTRAVENOUS; SUBCUTANEOUS EVERY 5 MIN PRN
Status: DISCONTINUED | OUTPATIENT
Start: 2021-05-24 | End: 2021-05-24 | Stop reason: HOSPADM

## 2021-05-24 RX ORDER — ACETAMINOPHEN 500 MG
1000 TABLET ORAL ONCE
Status: COMPLETED | OUTPATIENT
Start: 2021-05-24 | End: 2021-05-24

## 2021-05-24 RX ORDER — HYDROCODONE BITARTRATE AND ACETAMINOPHEN 5; 325 MG/1; MG/1
1 TABLET ORAL AS NEEDED
Status: DISCONTINUED | OUTPATIENT
Start: 2021-05-24 | End: 2021-05-24 | Stop reason: HOSPADM

## 2021-05-24 RX ORDER — SENNOSIDES 8.6 MG
17.2 TABLET ORAL NIGHTLY
Status: DISCONTINUED | OUTPATIENT
Start: 2021-05-24 | End: 2021-05-25

## 2021-05-24 RX ORDER — POLYETHYLENE GLYCOL 3350 17 G/17G
17 POWDER, FOR SOLUTION ORAL DAILY PRN
Status: DISCONTINUED | OUTPATIENT
Start: 2021-05-24 | End: 2021-05-25

## 2021-05-24 RX ORDER — DIPHENHYDRAMINE HYDROCHLORIDE 50 MG/ML
25 INJECTION INTRAMUSCULAR; INTRAVENOUS EVERY 4 HOURS PRN
Status: DISCONTINUED | OUTPATIENT
Start: 2021-05-24 | End: 2021-05-25

## 2021-05-24 RX ORDER — DEXAMETHASONE SODIUM PHOSPHATE 4 MG/ML
VIAL (ML) INJECTION AS NEEDED
Status: DISCONTINUED | OUTPATIENT
Start: 2021-05-24 | End: 2021-05-24 | Stop reason: SURG

## 2021-05-24 RX ORDER — SODIUM CHLORIDE, SODIUM LACTATE, POTASSIUM CHLORIDE, CALCIUM CHLORIDE 600; 310; 30; 20 MG/100ML; MG/100ML; MG/100ML; MG/100ML
INJECTION, SOLUTION INTRAVENOUS CONTINUOUS
Status: DISCONTINUED | OUTPATIENT
Start: 2021-05-24 | End: 2021-05-24 | Stop reason: HOSPADM

## 2021-05-24 RX ORDER — MORPHINE SULFATE 4 MG/ML
2 INJECTION, SOLUTION INTRAMUSCULAR; INTRAVENOUS EVERY 10 MIN PRN
Status: DISCONTINUED | OUTPATIENT
Start: 2021-05-24 | End: 2021-05-24 | Stop reason: HOSPADM

## 2021-05-24 RX ORDER — OXYCODONE HYDROCHLORIDE AND ACETAMINOPHEN 5; 325 MG/1; MG/1
1 TABLET ORAL EVERY 4 HOURS PRN
Status: DISCONTINUED | OUTPATIENT
Start: 2021-05-24 | End: 2021-05-25

## 2021-05-24 RX ORDER — ONDANSETRON 2 MG/ML
4 INJECTION INTRAMUSCULAR; INTRAVENOUS ONCE AS NEEDED
Status: DISCONTINUED | OUTPATIENT
Start: 2021-05-24 | End: 2021-05-24 | Stop reason: HOSPADM

## 2021-05-24 RX ORDER — OXYCODONE HYDROCHLORIDE AND ACETAMINOPHEN 5; 325 MG/1; MG/1
2 TABLET ORAL EVERY 4 HOURS PRN
Status: DISCONTINUED | OUTPATIENT
Start: 2021-05-24 | End: 2021-05-25

## 2021-05-24 RX ORDER — BACLOFEN 10 MG/1
10 TABLET ORAL 2 TIMES DAILY
Status: DISCONTINUED | OUTPATIENT
Start: 2021-05-24 | End: 2021-05-25

## 2021-05-24 RX ORDER — ONDANSETRON 2 MG/ML
4 INJECTION INTRAMUSCULAR; INTRAVENOUS EVERY 4 HOURS PRN
Status: DISCONTINUED | OUTPATIENT
Start: 2021-05-24 | End: 2021-05-25

## 2021-05-24 RX ORDER — HYDROMORPHONE HYDROCHLORIDE 1 MG/ML
0.2 INJECTION, SOLUTION INTRAMUSCULAR; INTRAVENOUS; SUBCUTANEOUS EVERY 5 MIN PRN
Status: DISCONTINUED | OUTPATIENT
Start: 2021-05-24 | End: 2021-05-24 | Stop reason: HOSPADM

## 2021-05-24 RX ORDER — SODIUM CHLORIDE, SODIUM LACTATE, POTASSIUM CHLORIDE, CALCIUM CHLORIDE 600; 310; 30; 20 MG/100ML; MG/100ML; MG/100ML; MG/100ML
INJECTION, SOLUTION INTRAVENOUS CONTINUOUS
Status: DISCONTINUED | OUTPATIENT
Start: 2021-05-24 | End: 2021-05-25

## 2021-05-24 RX ORDER — HYDROMORPHONE HYDROCHLORIDE 1 MG/ML
0.4 INJECTION, SOLUTION INTRAMUSCULAR; INTRAVENOUS; SUBCUTANEOUS EVERY 2 HOUR PRN
Status: DISCONTINUED | OUTPATIENT
Start: 2021-05-24 | End: 2021-05-25

## 2021-05-24 RX ORDER — HYDROMORPHONE HYDROCHLORIDE 1 MG/ML
0.2 INJECTION, SOLUTION INTRAMUSCULAR; INTRAVENOUS; SUBCUTANEOUS EVERY 2 HOUR PRN
Status: DISCONTINUED | OUTPATIENT
Start: 2021-05-24 | End: 2021-05-25

## 2021-05-24 RX ORDER — SODIUM PHOSPHATE, DIBASIC AND SODIUM PHOSPHATE, MONOBASIC 7; 19 G/133ML; G/133ML
1 ENEMA RECTAL ONCE AS NEEDED
Status: DISCONTINUED | OUTPATIENT
Start: 2021-05-24 | End: 2021-05-25

## 2021-05-24 RX ORDER — LIDOCAINE HYDROCHLORIDE 10 MG/ML
INJECTION, SOLUTION EPIDURAL; INFILTRATION; INTRACAUDAL; PERINEURAL AS NEEDED
Status: DISCONTINUED | OUTPATIENT
Start: 2021-05-24 | End: 2021-05-24 | Stop reason: SURG

## 2021-05-24 RX ORDER — ROCURONIUM BROMIDE 10 MG/ML
INJECTION, SOLUTION INTRAVENOUS AS NEEDED
Status: DISCONTINUED | OUTPATIENT
Start: 2021-05-24 | End: 2021-05-24 | Stop reason: SURG

## 2021-05-24 RX ORDER — ONDANSETRON 2 MG/ML
INJECTION INTRAMUSCULAR; INTRAVENOUS AS NEEDED
Status: DISCONTINUED | OUTPATIENT
Start: 2021-05-24 | End: 2021-05-24 | Stop reason: SURG

## 2021-05-24 RX ORDER — DIPHENHYDRAMINE HCL 25 MG
25 CAPSULE ORAL EVERY 4 HOURS PRN
Status: DISCONTINUED | OUTPATIENT
Start: 2021-05-24 | End: 2021-05-25

## 2021-05-24 RX ORDER — PHENYLEPHRINE HCL 10 MG/ML
VIAL (ML) INJECTION AS NEEDED
Status: DISCONTINUED | OUTPATIENT
Start: 2021-05-24 | End: 2021-05-24 | Stop reason: SURG

## 2021-05-24 RX ORDER — SODIUM CHLORIDE 9 MG/ML
INJECTION, SOLUTION INTRAVENOUS CONTINUOUS
Status: DISCONTINUED | OUTPATIENT
Start: 2021-05-24 | End: 2021-05-25

## 2021-05-24 RX ORDER — MORPHINE SULFATE 4 MG/ML
4 INJECTION, SOLUTION INTRAMUSCULAR; INTRAVENOUS EVERY 10 MIN PRN
Status: DISCONTINUED | OUTPATIENT
Start: 2021-05-24 | End: 2021-05-24 | Stop reason: HOSPADM

## 2021-05-24 RX ORDER — ACETAMINOPHEN 325 MG/1
650 TABLET ORAL EVERY 4 HOURS PRN
Status: DISCONTINUED | OUTPATIENT
Start: 2021-05-24 | End: 2021-05-25

## 2021-05-24 RX ADMIN — SODIUM CHLORIDE, SODIUM LACTATE, POTASSIUM CHLORIDE, CALCIUM CHLORIDE: 600; 310; 30; 20 INJECTION, SOLUTION INTRAVENOUS at 15:25:00

## 2021-05-24 RX ADMIN — LIDOCAINE HYDROCHLORIDE 50 MG: 10 INJECTION, SOLUTION EPIDURAL; INFILTRATION; INTRACAUDAL; PERINEURAL at 13:20:00

## 2021-05-24 RX ADMIN — ONDANSETRON 4 MG: 2 INJECTION INTRAMUSCULAR; INTRAVENOUS at 16:03:00

## 2021-05-24 RX ADMIN — SODIUM CHLORIDE, SODIUM LACTATE, POTASSIUM CHLORIDE, CALCIUM CHLORIDE: 600; 310; 30; 20 INJECTION, SOLUTION INTRAVENOUS at 16:25:00

## 2021-05-24 RX ADMIN — PHENYLEPHRINE HCL 100 MCG: 10 MG/ML VIAL (ML) INJECTION at 15:51:00

## 2021-05-24 RX ADMIN — PHENYLEPHRINE HCL 100 MCG: 10 MG/ML VIAL (ML) INJECTION at 13:45:00

## 2021-05-24 RX ADMIN — PHENYLEPHRINE HCL 100 MCG: 10 MG/ML VIAL (ML) INJECTION at 13:52:00

## 2021-05-24 RX ADMIN — PHENYLEPHRINE HCL 100 MCG: 10 MG/ML VIAL (ML) INJECTION at 14:11:00

## 2021-05-24 RX ADMIN — PHENYLEPHRINE HCL 100 MCG: 10 MG/ML VIAL (ML) INJECTION at 14:29:00

## 2021-05-24 RX ADMIN — ROCURONIUM BROMIDE 5 MG: 10 INJECTION, SOLUTION INTRAVENOUS at 13:20:00

## 2021-05-24 RX ADMIN — PHENYLEPHRINE HCL 100 MCG: 10 MG/ML VIAL (ML) INJECTION at 14:21:00

## 2021-05-24 RX ADMIN — DEXAMETHASONE SODIUM PHOSPHATE 4 MG: 4 MG/ML VIAL (ML) INJECTION at 13:25:00

## 2021-05-24 NOTE — CONSULTS
[facility]    Report of Consultation    Nirmal Tamayo 89 Patient Status:  Observation    10/8/1945 MRN [id number]   Location [facility] 4W/SW/SE Attending Tonny Jeffers MD   Hosp Day # 0 PCP Omega Geronimo     Date of Admis History:  Social History    Tobacco Use      Smoking status: Former Smoker      Smokeless tobacco: Never Used      Tobacco comment: quit 30 years    Vaping Use      Vaping Use: Never used    Alcohol use: Not Currently      Alcohol/week: 14.0 standard drink moderate to severe central canal narrowing  Impression:     Spinal stenosis of lumbar region, unspecified whether neurogenic claudication present      Spinal stenosis of lumbar region      Intractable pain       Recommendations:  Milo Griffiths continues to have

## 2021-05-24 NOTE — PLAN OF CARE
Problem: Patient Centered Care  Goal: Patient preferences are identified and integrated in the patient's plan of care  Description: Interventions:  - What would you like us to know as we care for you?   - Provide timely, complete, and accurate informatio fever/infection during anticipated neutropenic period  Description: INTERVENTIONS  - Monitor WBC  - Administer growth factors as ordered  - Implement neutropenic guidelines  Outcome: Progressing     Problem: SAFETY ADULT - FALL  Goal: Free from fall injury Implement communication aides and strategies  - Use visual cues when possible  - Listen attentively, be patient, do not interrupt  - Minimize distractions  - Allow time for understanding and response  - Establish method for patient to ask for assistance (c

## 2021-05-24 NOTE — BRIEF OP NOTE
Pre-Operative Diagnosis: postlaminectomy syndrome, lumbar spinal stenosis, weakness, radiculopathy lumbar region, low back pain, hx of lumbar fusion     Post-Operative Diagnosis: postlaminectomy syndrome, lumbar spinal stenosis, weakness, radiculopathy lum

## 2021-05-24 NOTE — PLAN OF CARE
Post op today . S/P L3-L4- TLIF- back dressing dry and intact- ice in place. Neuro checks intact. Alert and ox3. VSS, gomez intact. Gerry clears- ordered dinner tray. Denies pain. SCD's in place. IV fluids maintained.      Problem: PAIN - ADULT  Goal: Verbal o n safety measures.       Problem: DISCHARGE PLANNING  Goal: Discharge to home or other facility with appropriate resources  Description: INTERVENTIONS:  - Identify barriers to discharge w/pt and caregiver  - Include patient/family/discharge partner in dis

## 2021-05-24 NOTE — ANESTHESIA PREPROCEDURE EVALUATION
Anesthesia PreOp Note    HPI:     James Serrano is a 77year old male who presents for preoperative consultation requested by: Leti Yeh MD    Date of Surgery: 5/23/2021 - 5/24/2021    Procedure(s):  L3-4 transforaminal lumbar interbody fusi mouth 2 (two) times daily with meals. , Disp: , Rfl: , 5/23/2021 at Unknown time  Rivaroxaban (XARELTO) 20 MG Oral Tab, Take 20 mg by mouth daily with food. , Disp: , Rfl:       lactated ringers infusion, , Intravenous, Continuous, Ary Menard MD, Last • Cancer Father    • Cancer Mother      Social History    Socioeconomic History      Marital status: Single      Spouse name: Not on file      Number of children: Not on file      Years of education: Not on file      Highest education level: Not on file 05/24/2021    .0 05/24/2021     Lab Results   Component Value Date     (L) 05/24/2021    K 3.5 05/24/2021    CL 99 05/24/2021    CO2 27.0 05/24/2021    BUN 13 05/24/2021    CREATSERUM 0.84 05/24/2021    GLU 90 05/24/2021    CA 9.5 05/24/2021 planned.   Mariela Prasad  5/24/2021 12:11 PM

## 2021-05-24 NOTE — ANESTHESIA POSTPROCEDURE EVALUATION
Patient: Rubina Miguel Angel Stahl.    Procedure Summary     Date: 05/24/21 Room / Location: [facility] OR  / [facility] OR    Anesthesia Start: 6414 Anesthesia Stop:     Procedures:       L3-4 transforaminal lumbar interbody fusion with posterior instrumentation

## 2021-05-24 NOTE — H&P
[facility] Patient Status:  Observation    10/8/1945 MRN [id number]   Location [facility] 4W/SW/SE Attending Tamia Jewell MD   Hosp Day # 0 LETI Vaughn     Date:  2021 tablet by mouth every 6 (six) hours as needed for Pain., Disp: , Rfl:   Multiple Vitamins-Minerals (MULTI-VITAMIN/MINERALS) Oral Tab, Take 1 tablet by mouth daily. , Disp: , Rfl:   Rivaroxaban (XARELTO) 20 MG Oral Tab, Take 20 mg by mouth daily with food. , GLU 82 05/23/2021    CA 8.8 05/23/2021       Imaging:  Reviewed     Assessment:  Patient Active Problem List:     Gait abnormality     Cervical stenosis of spinal canal     Myelomalacia (HCC)     Small vessel disease ([facility] [address]     Spinal stenosis of lumbar reg

## 2021-05-24 NOTE — PHYSICAL THERAPY NOTE
Orders received and chart reviewed. Patient admitted for intractable back pain; is undergoing L3-4 TLIF with Dr. Priya Liu today. Will d/c current orders and will need new orders post operatively.

## 2021-05-24 NOTE — ANESTHESIA PROCEDURE NOTES
Airway  Urgency: Elective      General Information and Staff    Patient location during procedure: OR  Anesthesiologist: Yadira Bradford MD  Resident/CRNA: Kellen Baldwin CRNA  Performed: CRNA     Indications and Patient Condition  Indications for airway ma

## 2021-05-24 NOTE — OCCUPATIONAL THERAPY NOTE
Patient admitted for intractable back pain; is undergoing L3-4 TLIF with Dr. Lisandra Delvalle today @ 10:40; will d/c current orders and will need new orders post operatively.      [facility], OTR/L ext [phone]

## 2021-05-24 NOTE — PROGRESS NOTES
[facility]    Progress Note    Nirmal Tamayo 89 Patient Status:  Observation    10/8/1945 MRN [id number]   Location [facility] UNIT Attending Tamia Jewell MD   Hosp Day # 0 [address] injection 50 mcg, 50 mcg, Intravenous, Q5 Min PRN  HYDROmorphone HCl (DILAUDID) 1 MG/ML injection 0.2 mg, 0.2 mg, Intravenous, Q5 Min PRN  HYDROmorphone HCl (DILAUDID) 1 MG/ML injection 0.4 mg, 0.4 mg, Intravenous, Q5 Min PRN  HYDROmorphone HCl (DILAUDID) for DVT prophylaxis  Anticoagulation on hold   **Certification      PHYSICIAN Certification of Need for Inpatient Hospitalization - Initial Certification    Patient will require inpatient services that will reasonably be expected to span two midnight's bas

## 2021-05-25 VITALS
OXYGEN SATURATION: 98 % | TEMPERATURE: 98 F | DIASTOLIC BLOOD PRESSURE: 65 MMHG | WEIGHT: 148 LBS | HEART RATE: 89 BPM | SYSTOLIC BLOOD PRESSURE: 111 MMHG | RESPIRATION RATE: 18 BRPM | HEIGHT: 68 IN | BODY MASS INDEX: 22.43 KG/M2

## 2021-05-25 PROCEDURE — 97535 SELF CARE MNGMENT TRAINING: CPT

## 2021-05-25 PROCEDURE — 97166 OT EVAL MOD COMPLEX 45 MIN: CPT

## 2021-05-25 PROCEDURE — 85014 HEMATOCRIT: CPT | Performed by: PHYSICIAN ASSISTANT

## 2021-05-25 PROCEDURE — 97162 PT EVAL MOD COMPLEX 30 MIN: CPT

## 2021-05-25 PROCEDURE — 80048 BASIC METABOLIC PNL TOTAL CA: CPT | Performed by: PHYSICIAN ASSISTANT

## 2021-05-25 PROCEDURE — 97116 GAIT TRAINING THERAPY: CPT

## 2021-05-25 PROCEDURE — 85018 HEMOGLOBIN: CPT | Performed by: PHYSICIAN ASSISTANT

## 2021-05-25 PROCEDURE — 97530 THERAPEUTIC ACTIVITIES: CPT

## 2021-05-25 RX ORDER — OXYCODONE HYDROCHLORIDE AND ACETAMINOPHEN 5; 325 MG/1; MG/1
1 TABLET ORAL EVERY 4 HOURS PRN
Qty: 20 TABLET | Refills: 0 | Status: SHIPPED | OUTPATIENT
Start: 2021-05-25

## 2021-05-25 NOTE — PLAN OF CARE
Pt is ao x 4, pt is somewhat flighty and this RN needs to redirect pt when using walker, pt is voiding freely, pt sleeping on room air, will continue to monitor pt for safety    Problem: PAIN - ADULT  Goal: Verbalizes/displays adequate comfort level or pat Identify barriers to discharge w/pt and caregiver  - Include patient/family/discharge partner in discharge planning  - Arrange for needed discharge resources and transportation as appropriate  - Identify discharge learning needs (meds, wound care, etc)  -

## 2021-05-25 NOTE — PHYSICAL THERAPY NOTE
PHYSICAL THERAPY EVALUATION - INPATIENT     Room Number: 403/403-A  Evaluation Date: 5/25/2021  Type of Evaluation: Initial   Physician Order: PT Eval and Treat    Presenting Problem: Spinal stenosis s/p L3-4 TLIF  Reason for Therapy: Mobility Dysfunction PT;24 hour care/supervision    PLAN  PT Treatment Plan: Bed mobility; Body mechanics; Don/doff brace; Energy conservation;Patient education; Family education;Gait training;Strengthening;Transfer training;Balance training  Rehab Potential : Good  Frequency (Obs Lower extremity ROM is within functional limits  Lower extremity strength is within functional limits     BALANCE  Static Sitting: Good  Dynamic Sitting: Good  Static Standing: Fair  Dynamic Standing: Fair    AM-PAC '6-Clicks' INPATIENT SHORT FORM - BASI and supervision   Goal #4   Current Status    Goal #5 Patient to demonstrate independence with home activity/exercise instructions provided to patient in preparation for discharge.    Goal #5   Current Status    Goal #6    Goal #6  Current Status

## 2021-05-25 NOTE — DISCHARGE SUMMARY
[facility]    Discharge Summary    Nirmal Arringtonanthony 89 Patient Status:  Observation    10/8/1945 MRN [id number]   Location [facility] 4W/SW/SE Attending Simran Willingham MD   Hosp Day # 0 PCP Kristy Torres     Date of Admissio  Underwent surgery S/p L3-4 TLIF. Postop course uncomplicated. Pain well controlled. Plan to resume anticoagulation in few days. Cleared from Ortho for discharge. Discharge home with home health.         Discharge Condition: Stable         Discharge Me

## 2021-05-25 NOTE — PLAN OF CARE
Deepak is pod 1 TLIF. Passed PT, pain well controlled, sx drsg c/d/I-no s/s infection.  Home self care with family-refer to dc instructions  Problem: Patient Centered Care  Goal: Patient preferences are identified and integrated in the patient's plan of car appropriate  Outcome: Adequate for Discharge     Problem: RISK FOR INFECTION - ADULT  Goal: Absence of fever/infection during anticipated neutropenic period  Description: INTERVENTIONS  - Monitor WBC  - Administer growth factors as ordered  - Implement bia understand and participate in their care  Description: Interventions:  - Assess communication ability and preferred communication style  - Implement communication aides and strategies  - Use visual cues when possible  - Listen attentively, be patient, do n

## 2021-05-25 NOTE — OCCUPATIONAL THERAPY NOTE
OCCUPATIONAL THERAPY EVALUATION - INPATIENT      Room Number: 403/403-A  Evaluation Date: 5/25/2021  Type of Evaluation: Initial  Presenting Problem: Spinal stenosis of lumbar region, unspecified whether neurogenic claudication present    Physician Order: inpatient OT to address the above deficits, maximizing patient's ability to return to prior level of function.     The patient's Approx Degree of Impairment: 42.8% has been calculated based on documentation in the [facility] '6 clicks' Inpatient Daily Activity S 3 weeks)    Stairs in Home: none - access to elevator  Assistive Device(s) Used: RW last 3 weeks    Prior Level of [place]: Prior to admission pt was living alone and was independent with self care and using a RW for functional mobility for the last 3 compensatory techniques for LB dressing, spine precautions, AE introduction (handout with recommendations provided), benefits of reacher to avoid bending during LB dressing, energy conservation, activity pacing - pt verbalized understanding    Patient End

## 2021-05-25 NOTE — PROGRESS NOTES
No C/O of CP SOB NV. Pain controlled on percocet 5. Right leg pain improved. Baseline weakness of the right quad  AVSS  Dressing is CDI  Motor to bilateral LE at baseline 5/5 throughout except right  Quad 4-/5.    Nv intact  Homans neg    S/p L3-4 TLIF    M

## 2021-05-25 NOTE — PHYSICAL THERAPY NOTE
PHYSICAL THERAPY TREATMENT NOTE - INPATIENT     Room Number: 403/403-A       Presenting Problem: Spinal stenosis s/p L3-4 TLIF    Problem List  Principal Problem:    Spinal stenosis of lumbar region, unspecified whether neurogenic claudication present  Act training    SUBJECTIVE  Patient agreeable to therapy session; very insistent on going home    OBJECTIVE  Precautions: Lumbar brace;Spine (LSO brace on at all times.)    WEIGHT BEARING RESTRICTION  Weight Bearing Restriction: None    PAIN ASSESSMENT   Ratin independent   Goal #3   Current Status Supervision    Goal #4 Patient will negotiate 1 stairs/one curb w/ assistive device and supervision   Goal #4   Current Status NT   Goal #5 Patient to demonstrate independence with home activity/exercise instructions

## 2021-05-28 ENCOUNTER — TELEPHONE (OUTPATIENT)
Dept: CARDIOLOGY | Age: 76
End: 2021-05-28

## 2021-05-28 NOTE — PAYOR COMM NOTE
Discharge Notification    Patient Name: Benny AnayaCarmella carranza: [facility] #: [id number]  Authorization Number: [id number]  Admit Date/Time: 5/23/2021 4:33 PM  Discharge Date/Time: 5/25/2021 3:01 PM

## 2021-06-08 NOTE — OPERATIVE REPORT
[facility]    PATIENT'S NAME: TOMAS ARENASdeeaporestes 25 PHYSICIAN: Andrew Brown MD   OPERATING PHYSICIAN: Benjie Marin MD   PATIENT ACCOUNT#:   [id number]    LOCATION:  [address] [place] [address] #:   [id number]       DATE OF B anesthetic complications, cardiac complications, death in the preoperative period. OPERATIVE TECHNIQUE:  The patient was seen preoperatively and surgery site was marked. SCDs and TEDs were placed on the lower extremities.   The patient was then brought complete discectomy to prepare the endplates for fusion. We trialed and elected to use a 12 x 28 mm cage. Local autograft and allograft bone was placed both into the cage as well as into the disc space and the cage inserted under fluoroscopic guidance.

## 2021-08-19 PROBLEM — I34.0 MILD MITRAL REGURGITATION: Status: RESOLVED | Noted: 2019-08-22 | Resolved: 2021-08-19

## 2021-08-19 PROBLEM — I35.1 MILD AORTIC REGURGITATION: Status: RESOLVED | Noted: 2019-08-22 | Resolved: 2021-08-19

## 2021-08-19 PROBLEM — I08.0 MILD MITRAL AND AORTIC REGURGITATION: Status: ACTIVE | Noted: 2021-08-19

## 2021-08-26 ENCOUNTER — APPOINTMENT (OUTPATIENT)
Dept: CARDIOLOGY | Age: 76
End: 2021-08-26

## 2021-09-16 RX ORDER — CARVEDILOL 25 MG/1
TABLET ORAL
Qty: 180 TABLET | Refills: 0 | Status: SHIPPED | OUTPATIENT
Start: 2021-09-16 | End: 2021-12-15

## 2021-10-04 ENCOUNTER — OFFICE VISIT (OUTPATIENT)
Dept: CARDIOLOGY | Age: 76
End: 2021-10-04

## 2021-10-04 VITALS
WEIGHT: 147 LBS | SYSTOLIC BLOOD PRESSURE: 120 MMHG | HEART RATE: 88 BPM | BODY MASS INDEX: 22.35 KG/M2 | DIASTOLIC BLOOD PRESSURE: 70 MMHG

## 2021-10-04 DIAGNOSIS — I73.9 PERIPHERAL VASCULAR DISEASE (CMD): ICD-10-CM

## 2021-10-04 DIAGNOSIS — I10 ESSENTIAL HYPERTENSION: ICD-10-CM

## 2021-10-04 DIAGNOSIS — I45.10 RBBB: ICD-10-CM

## 2021-10-04 DIAGNOSIS — I77.1 CELIAC ARTERY STENOSIS (CMD): ICD-10-CM

## 2021-10-04 DIAGNOSIS — E78.5 DYSLIPIDEMIA: ICD-10-CM

## 2021-10-04 DIAGNOSIS — I48.20 CHRONIC ATRIAL FIBRILLATION (CMD): Primary | ICD-10-CM

## 2021-10-04 DIAGNOSIS — I08.0 MILD MITRAL AND AORTIC REGURGITATION: ICD-10-CM

## 2021-10-04 DIAGNOSIS — Z79.01 LONG TERM (CURRENT) USE OF ANTICOAGULANTS: ICD-10-CM

## 2021-10-04 PROCEDURE — 3078F DIAST BP <80 MM HG: CPT | Performed by: INTERNAL MEDICINE

## 2021-10-04 PROCEDURE — 99213 OFFICE O/P EST LOW 20 MIN: CPT | Performed by: INTERNAL MEDICINE

## 2021-10-04 PROCEDURE — 3074F SYST BP LT 130 MM HG: CPT | Performed by: INTERNAL MEDICINE

## 2021-10-04 ASSESSMENT — ENCOUNTER SYMPTOMS
ALLERGIC/IMMUNOLOGIC COMMENTS: NO NEW FOOD ALLERGIES
HEMOPTYSIS: 0
FEVER: 0
SYNCOPE: 0
DOUBLE VISION: 0
BRUISES/BLEEDS EASILY: 0
BACK PAIN: 1
CHILLS: 0
SHORTNESS OF BREATH: 0
COUGH: 0
WEIGHT GAIN: 0
SUSPICIOUS LESIONS: 0
NEAR-SYNCOPE: 0
WEAKNESS: 0
WEIGHT LOSS: 0
DEPRESSION: 0
HEMATOCHEZIA: 0
FOCAL WEAKNESS: 0
LIGHT-HEADEDNESS: 0

## 2021-12-14 LAB
ALBUMIN SERPL-MCNC: 4.4 G/DL (ref 3.7–4.7)
ALBUMIN/GLOB SERPL: 1.6 {RATIO} (ref 1.2–2.2)
ALP SERPL-CCNC: 79 IU/L (ref 44–121)
ALT SERPL-CCNC: 27 IU/L (ref 0–44)
AST SERPL-CCNC: 42 IU/L (ref 0–40)
BILIRUB SERPL-MCNC: 0.4 MG/DL (ref 0–1.2)
BUN SERPL-MCNC: 15 MG/DL (ref 8–27)
BUN/CREAT SERPL: 16 (ref 10–24)
CALCIUM SERPL-MCNC: 9.5 MG/DL (ref 8.6–10.2)
CHLORIDE SERPL-SCNC: 107 MMOL/L (ref 96–106)
CHOLEST SERPL-MCNC: 215 MG/DL (ref 100–199)
CO2 SERPL-SCNC: 21 MMOL/L (ref 20–29)
CREAT SERPL-MCNC: 0.91 MG/DL (ref 0.76–1.27)
GLOBULIN SER-MCNC: 2.7 G/DL (ref 1.5–4.5)
GLUCOSE SERPL-MCNC: 99 MG/DL (ref 65–99)
HDLC SERPL-MCNC: 46 MG/DL
LDL/HDL (OFFPRE3): 3.4 RATIO (ref 0–3.6)
LDLC SERPL CALC-MCNC: 157 MG/DL (ref 0–99)
LENGTH OF FAST TIME PATIENT: YES H
LENGTH OF FAST TIME PATIENT: YES H
POTASSIUM SERPL-SCNC: 4.6 MMOL/L (ref 3.5–5.2)
PROT SERPL-MCNC: 7.1 G/DL (ref 6–8.5)
SODIUM SERPL-SCNC: 141 MMOL/L (ref 134–144)
TRIGL SERPL-MCNC: 66 MG/DL (ref 0–149)
VLDLC SERPL CALC-MCNC: 12 MG/DL (ref 5–40)

## 2021-12-15 RX ORDER — CARVEDILOL 25 MG/1
TABLET ORAL
Qty: 180 TABLET | Refills: 3 | Status: SHIPPED | OUTPATIENT
Start: 2021-12-15 | End: 2022-12-05

## 2021-12-16 ENCOUNTER — TELEPHONE (OUTPATIENT)
Dept: CARDIOLOGY | Age: 76
End: 2021-12-16

## 2021-12-16 ENCOUNTER — E-ADVICE (OUTPATIENT)
Dept: CARDIOLOGY | Age: 76
End: 2021-12-16

## 2021-12-28 ENCOUNTER — OFFICE VISIT (OUTPATIENT)
Dept: CARDIOLOGY | Age: 76
End: 2021-12-28

## 2021-12-28 VITALS
BODY MASS INDEX: 22.28 KG/M2 | HEART RATE: 71 BPM | RESPIRATION RATE: 16 BRPM | SYSTOLIC BLOOD PRESSURE: 124 MMHG | WEIGHT: 147 LBS | DIASTOLIC BLOOD PRESSURE: 82 MMHG | HEIGHT: 68 IN

## 2021-12-28 DIAGNOSIS — I08.0 MILD MITRAL AND AORTIC REGURGITATION: ICD-10-CM

## 2021-12-28 DIAGNOSIS — I48.20 CHRONIC ATRIAL FIBRILLATION (CMD): Primary | ICD-10-CM

## 2021-12-28 DIAGNOSIS — I45.10 RBBB: ICD-10-CM

## 2021-12-28 DIAGNOSIS — I73.9 PERIPHERAL VASCULAR DISEASE (CMD): ICD-10-CM

## 2021-12-28 DIAGNOSIS — E78.5 DYSLIPIDEMIA: ICD-10-CM

## 2021-12-28 DIAGNOSIS — I10 ESSENTIAL HYPERTENSION: ICD-10-CM

## 2021-12-28 DIAGNOSIS — Z79.01 LONG TERM (CURRENT) USE OF ANTICOAGULANTS: ICD-10-CM

## 2021-12-28 DIAGNOSIS — I77.1 CELIAC ARTERY STENOSIS (CMD): ICD-10-CM

## 2021-12-28 PROCEDURE — 99213 OFFICE O/P EST LOW 20 MIN: CPT | Performed by: INTERNAL MEDICINE

## 2021-12-28 PROCEDURE — 3074F SYST BP LT 130 MM HG: CPT | Performed by: INTERNAL MEDICINE

## 2021-12-28 PROCEDURE — 3079F DIAST BP 80-89 MM HG: CPT | Performed by: INTERNAL MEDICINE

## 2021-12-28 RX ORDER — SILDENAFIL CITRATE 20 MG/1
20 TABLET ORAL DAILY
Qty: 90 TABLET | Refills: 3 | Status: SHIPPED | OUTPATIENT
Start: 2021-12-28 | End: 2022-01-03 | Stop reason: SDUPTHER

## 2021-12-28 RX ORDER — ROSUVASTATIN CALCIUM 10 MG/1
10 TABLET, COATED ORAL DAILY
Qty: 90 TABLET | Refills: 1 | Status: SHIPPED | OUTPATIENT
Start: 2021-12-28 | End: 2022-06-15

## 2021-12-28 ASSESSMENT — ENCOUNTER SYMPTOMS
WEIGHT LOSS: 0
SHORTNESS OF BREATH: 0
HEMOPTYSIS: 0
COUGH: 0
LIGHT-HEADEDNESS: 0
ALLERGIC/IMMUNOLOGIC COMMENTS: NO NEW FOOD ALLERGIES
BRUISES/BLEEDS EASILY: 0
HEMATOCHEZIA: 0
SYNCOPE: 0
NEAR-SYNCOPE: 0
WEIGHT GAIN: 0
FEVER: 0
CHILLS: 0
FOCAL WEAKNESS: 0
SUSPICIOUS LESIONS: 0
DEPRESSION: 0
DOUBLE VISION: 0

## 2021-12-30 ENCOUNTER — TELEPHONE (OUTPATIENT)
Dept: CARDIOLOGY | Age: 76
End: 2021-12-30

## 2022-01-01 ENCOUNTER — EXTERNAL RECORD (OUTPATIENT)
Dept: OTHER | Age: 77
End: 2022-01-01

## 2022-01-03 ENCOUNTER — TELEPHONE (OUTPATIENT)
Dept: CARDIOLOGY | Age: 77
End: 2022-01-03

## 2022-01-03 RX ORDER — SILDENAFIL CITRATE 20 MG/1
20 TABLET ORAL PRN
Qty: 30 TABLET | Refills: 1 | Status: SHIPPED | OUTPATIENT
Start: 2022-01-03 | End: 2023-01-24 | Stop reason: SDUPTHER

## 2022-03-08 DIAGNOSIS — I48.20 CHRONIC ATRIAL FIBRILLATION (CMD): Primary | ICD-10-CM

## 2022-03-21 RX ORDER — RIVAROXABAN 20 MG/1
TABLET, FILM COATED ORAL
Qty: 90 TABLET | Refills: 3 | Status: SHIPPED | OUTPATIENT
Start: 2022-03-21 | End: 2023-03-03

## 2022-03-21 RX ORDER — RIVAROXABAN 20 MG/1
20 TABLET, FILM COATED ORAL
Qty: 90 TABLET | Refills: 3 | Status: SHIPPED | OUTPATIENT
Start: 2022-03-21 | End: 2022-06-30 | Stop reason: SDUPTHER

## 2022-06-08 LAB
ALBUMIN SERPL-MCNC: 4.6 G/DL (ref 3.7–4.7)
ALBUMIN/GLOB SERPL: 2 {RATIO} (ref 1.2–2.2)
ALP SERPL-CCNC: 58 IU/L (ref 44–121)
ALT SERPL-CCNC: 43 IU/L (ref 0–44)
AST SERPL-CCNC: 46 IU/L (ref 0–40)
BILIRUB SERPL-MCNC: 0.4 MG/DL (ref 0–1.2)
BUN SERPL-MCNC: 18 MG/DL (ref 8–27)
BUN/CREAT SERPL: 21 (ref 10–24)
CALCIUM SERPL-MCNC: 9.4 MG/DL (ref 8.6–10.2)
CHLORIDE SERPL-SCNC: 102 MMOL/L (ref 96–106)
CHOLEST SERPL-MCNC: 136 MG/DL (ref 100–199)
CO2 SERPL-SCNC: 23 MMOL/L (ref 20–29)
CREAT SERPL-MCNC: 0.84 MG/DL (ref 0.76–1.27)
GFR SERPLBLD SCHWARTZ-ARVRAT: 91 ML/MIN/1.73
GLOBULIN SER-MCNC: 2.3 G/DL (ref 1.5–4.5)
GLUCOSE SERPL-MCNC: 91 MG/DL (ref 65–99)
HDL SERPL-SCNC: 32.9 UMOL/L
HDLC SERPL-MCNC: 56 MG/DL
HLD.LARGE SERPL-SCNC: 192 NMOL/L
LDL PARTICLE NUMBER (NMRPN): 404 NMOL/L
LDL SERPL QN: 20.3 NM
LDL SMALL SERPL-SCNC: ABNORMAL NMOL/L
LDL/HDL (OFFPRE3): 1.2 RATIO (ref 0–3.6)
LDLC SERPL CALC-MCNC: 68 MG/DL (ref 0–99)
LENGTH OF FAST TIME PATIENT: YES H
LENGTH OF FAST TIME PATIENT: YES H
NONHDLC SERPL-MCNC: 80 MG/DL (ref 0–129)
PATHOLOGY STUDY: <25
POTASSIUM SERPL-SCNC: 4.4 MMOL/L (ref 3.5–5.2)
PROT SERPL-MCNC: 6.9 G/DL (ref 6–8.5)
SODIUM SERPL-SCNC: 139 MMOL/L (ref 134–144)
TRIGL SERPL-MCNC: 52 MG/DL (ref 0–149)

## 2022-06-14 DIAGNOSIS — E78.5 DYSLIPIDEMIA: Primary | ICD-10-CM

## 2022-06-15 ENCOUNTER — TELEPHONE (OUTPATIENT)
Dept: CARDIOLOGY | Age: 77
End: 2022-06-15

## 2022-06-15 RX ORDER — ROSUVASTATIN CALCIUM 10 MG/1
10 TABLET, COATED ORAL DAILY
Qty: 90 TABLET | Refills: 1 | Status: SHIPPED | OUTPATIENT
Start: 2022-06-15 | End: 2022-09-08

## 2022-06-20 ENCOUNTER — TELEPHONE (OUTPATIENT)
Dept: CARDIOLOGY | Age: 77
End: 2022-06-20

## 2022-06-28 ENCOUNTER — TELEPHONE (OUTPATIENT)
Dept: CARDIOLOGY | Age: 77
End: 2022-06-28

## 2022-06-30 ENCOUNTER — OFFICE VISIT (OUTPATIENT)
Dept: CARDIOLOGY | Age: 77
End: 2022-06-30

## 2022-06-30 VITALS
WEIGHT: 152.2 LBS | HEART RATE: 79 BPM | RESPIRATION RATE: 16 BRPM | BODY MASS INDEX: 23.07 KG/M2 | SYSTOLIC BLOOD PRESSURE: 136 MMHG | HEIGHT: 68 IN | DIASTOLIC BLOOD PRESSURE: 72 MMHG

## 2022-06-30 DIAGNOSIS — I10 ESSENTIAL HYPERTENSION: ICD-10-CM

## 2022-06-30 DIAGNOSIS — I77.1 CELIAC ARTERY STENOSIS (CMD): ICD-10-CM

## 2022-06-30 DIAGNOSIS — I48.20 CHRONIC ATRIAL FIBRILLATION (CMD): Primary | ICD-10-CM

## 2022-06-30 DIAGNOSIS — E78.5 DYSLIPIDEMIA: ICD-10-CM

## 2022-06-30 DIAGNOSIS — I45.10 RBBB: ICD-10-CM

## 2022-06-30 DIAGNOSIS — I08.0 MILD MITRAL AND AORTIC REGURGITATION: ICD-10-CM

## 2022-06-30 DIAGNOSIS — I73.9 PERIPHERAL VASCULAR DISEASE (CMD): ICD-10-CM

## 2022-06-30 DIAGNOSIS — Z79.01 LONG TERM (CURRENT) USE OF ANTICOAGULANTS: ICD-10-CM

## 2022-06-30 PROCEDURE — 3078F DIAST BP <80 MM HG: CPT | Performed by: INTERNAL MEDICINE

## 2022-06-30 PROCEDURE — 99213 OFFICE O/P EST LOW 20 MIN: CPT | Performed by: INTERNAL MEDICINE

## 2022-06-30 PROCEDURE — 3075F SYST BP GE 130 - 139MM HG: CPT | Performed by: INTERNAL MEDICINE

## 2022-06-30 SDOH — HEALTH STABILITY: PHYSICAL HEALTH: ON AVERAGE, HOW MANY DAYS PER WEEK DO YOU ENGAGE IN MODERATE TO STRENUOUS EXERCISE (LIKE A BRISK WALK)?: 2 DAYS

## 2022-06-30 ASSESSMENT — PATIENT HEALTH QUESTIONNAIRE - PHQ9
SUM OF ALL RESPONSES TO PHQ9 QUESTIONS 1 AND 2: 0
CLINICAL INTERPRETATION OF PHQ2 SCORE: NO FURTHER SCREENING NEEDED
SUM OF ALL RESPONSES TO PHQ9 QUESTIONS 1 AND 2: 0
2. FEELING DOWN, DEPRESSED OR HOPELESS: NOT AT ALL
1. LITTLE INTEREST OR PLEASURE IN DOING THINGS: NOT AT ALL

## 2022-06-30 ASSESSMENT — ENCOUNTER SYMPTOMS
FEVER: 0
CHILLS: 0
SUSPICIOUS LESIONS: 0
ALLERGIC/IMMUNOLOGIC COMMENTS: NO NEW FOOD ALLERGIES
WEIGHT LOSS: 0
HALLUCINATIONS: 0
HEMATOCHEZIA: 0
SHORTNESS OF BREATH: 0
DOUBLE VISION: 0
DIZZINESS: 0
WEAKNESS: 0
SYNCOPE: 0
HEMOPTYSIS: 0
DEPRESSION: 0
NEAR-SYNCOPE: 0
BRUISES/BLEEDS EASILY: 0
COUGH: 0
FOCAL WEAKNESS: 0
BACK PAIN: 1
ABDOMINAL PAIN: 0
WEIGHT GAIN: 0

## 2022-07-28 ENCOUNTER — HOSPITAL ENCOUNTER (INPATIENT)
Age: 77
LOS: 2 days | Discharge: HOME | DRG: 863 | End: 2022-07-30
Attending: EMERGENCY MEDICINE | Admitting: INTERNAL MEDICINE

## 2022-07-28 ENCOUNTER — APPOINTMENT (OUTPATIENT)
Dept: CT IMAGING | Age: 77
DRG: 863 | End: 2022-07-28

## 2022-07-28 DIAGNOSIS — T81.49XA SURGICAL WOUND INFECTION: Primary | ICD-10-CM

## 2022-07-28 DIAGNOSIS — M86.9 OSTEOMYELITIS, UNSPECIFIED SITE, UNSPECIFIED TYPE (CMD): ICD-10-CM

## 2022-07-28 PROBLEM — M46.28 SACRAL OSTEOMYELITIS (CMD): Status: ACTIVE | Noted: 2022-07-28

## 2022-07-28 LAB
ALBUMIN SERPL-MCNC: 3.4 G/DL (ref 3.6–5.1)
ALBUMIN/GLOB SERPL: 0.8 {RATIO} (ref 1–2.4)
ALP SERPL-CCNC: 103 UNITS/L (ref 45–117)
ALT SERPL-CCNC: 42 UNITS/L
ANION GAP SERPL CALC-SCNC: 9 MMOL/L (ref 7–19)
AST SERPL-CCNC: 41 UNITS/L
BASOPHILS # BLD: 0 K/MCL (ref 0–0.3)
BASOPHILS NFR BLD: 0 %
BILIRUB SERPL-MCNC: 0.5 MG/DL (ref 0.2–1)
BUN SERPL-MCNC: 32 MG/DL (ref 6–20)
BUN/CREAT SERPL: 39 (ref 7–25)
CALCIUM SERPL-MCNC: 9.2 MG/DL (ref 8.4–10.2)
CHLORIDE SERPL-SCNC: 108 MMOL/L (ref 97–110)
CO2 SERPL-SCNC: 25 MMOL/L (ref 21–32)
CREAT SERPL-MCNC: 0.82 MG/DL (ref 0.67–1.17)
CRP SERPL-MCNC: <0.3 MG/DL
DEPRECATED RDW RBC: 51 FL (ref 39–50)
EOSINOPHIL # BLD: 0.1 K/MCL (ref 0–0.5)
EOSINOPHIL NFR BLD: 2 %
ERYTHROCYTE [DISTWIDTH] IN BLOOD: 14.1 % (ref 11–15)
ERYTHROCYTE [SEDIMENTATION RATE] IN BLOOD BY WESTERGREN METHOD: 13 MM/HR (ref 0–20)
FASTING DURATION TIME PATIENT: ABNORMAL H
FLUAV RNA RESP QL NAA+PROBE: NOT DETECTED
FLUBV RNA RESP QL NAA+PROBE: NOT DETECTED
GFR SERPLBLD BASED ON 1.73 SQ M-ARVRAT: >90 ML/MIN
GLOBULIN SER-MCNC: 4.2 G/DL (ref 2–4)
GLUCOSE SERPL-MCNC: 94 MG/DL (ref 70–99)
HCT VFR BLD CALC: 35.4 % (ref 39–51)
HGB BLD-MCNC: 11.5 G/DL (ref 13–17)
IMM GRANULOCYTES # BLD AUTO: 0 K/MCL (ref 0–0.2)
IMM GRANULOCYTES # BLD: 1 %
LYMPHOCYTES # BLD: 1.4 K/MCL (ref 1–4)
LYMPHOCYTES NFR BLD: 23 %
MCH RBC QN AUTO: 31.9 PG (ref 26–34)
MCHC RBC AUTO-ENTMCNC: 32.5 G/DL (ref 32–36.5)
MCV RBC AUTO: 98.3 FL (ref 78–100)
MONOCYTES # BLD: 0.7 K/MCL (ref 0.3–0.9)
MONOCYTES NFR BLD: 11 %
NEUTROPHILS # BLD: 3.9 K/MCL (ref 1.8–7.7)
NEUTROPHILS NFR BLD: 63 %
NRBC BLD MANUAL-RTO: 0 /100 WBC
PLATELET # BLD AUTO: 213 K/MCL (ref 140–450)
POTASSIUM SERPL-SCNC: 4.3 MMOL/L (ref 3.4–5.1)
PROT SERPL-MCNC: 7.6 G/DL (ref 6.4–8.2)
RBC # BLD: 3.6 MIL/MCL (ref 4.5–5.9)
RSV AG NPH QL IA.RAPID: NOT DETECTED
SARS-COV-2 RNA RESP QL NAA+PROBE: NOT DETECTED
SERVICE CMNT-IMP: NORMAL
SERVICE CMNT-IMP: NORMAL
SODIUM SERPL-SCNC: 138 MMOL/L (ref 135–145)
WBC # BLD: 6.1 K/MCL (ref 4.2–11)

## 2022-07-28 PROCEDURE — 99223 1ST HOSP IP/OBS HIGH 75: CPT | Performed by: INTERNAL MEDICINE

## 2022-07-28 PROCEDURE — 36415 COLL VENOUS BLD VENIPUNCTURE: CPT

## 2022-07-28 PROCEDURE — 72193 CT PELVIS W/DYE: CPT

## 2022-07-28 PROCEDURE — G1004 CDSM NDSC: HCPCS

## 2022-07-28 PROCEDURE — 86140 C-REACTIVE PROTEIN: CPT | Performed by: EMERGENCY MEDICINE

## 2022-07-28 PROCEDURE — 10004651 HB RX, NO CHARGE ITEM: Performed by: INTERNAL MEDICINE

## 2022-07-28 PROCEDURE — 80053 COMPREHEN METABOLIC PANEL: CPT | Performed by: EMERGENCY MEDICINE

## 2022-07-28 PROCEDURE — 87205 SMEAR GRAM STAIN: CPT | Performed by: PHYSICIAN ASSISTANT

## 2022-07-28 PROCEDURE — 10002807 HB RX 258: Performed by: EMERGENCY MEDICINE

## 2022-07-28 PROCEDURE — 10002803 HB RX 637: Performed by: INTERNAL MEDICINE

## 2022-07-28 PROCEDURE — 85025 COMPLETE CBC W/AUTO DIFF WBC: CPT | Performed by: EMERGENCY MEDICINE

## 2022-07-28 PROCEDURE — 10006031 HB ROOM CHARGE TELEMETRY

## 2022-07-28 PROCEDURE — 10002800 HB RX 250 W HCPCS: Performed by: EMERGENCY MEDICINE

## 2022-07-28 PROCEDURE — 85652 RBC SED RATE AUTOMATED: CPT | Performed by: EMERGENCY MEDICINE

## 2022-07-28 PROCEDURE — 0241U COVID/FLU/RSV PANEL: CPT | Performed by: EMERGENCY MEDICINE

## 2022-07-28 PROCEDURE — 10002805 HB CONTRAST AGENT: Performed by: PHYSICIAN ASSISTANT

## 2022-07-28 PROCEDURE — 99284 EMERGENCY DEPT VISIT MOD MDM: CPT

## 2022-07-28 PROCEDURE — 87040 BLOOD CULTURE FOR BACTERIA: CPT | Performed by: PHYSICIAN ASSISTANT

## 2022-07-28 RX ORDER — POLYETHYLENE GLYCOL 3350 17 G/17G
17 POWDER, FOR SOLUTION ORAL DAILY PRN
Status: DISCONTINUED | OUTPATIENT
Start: 2022-07-28 | End: 2022-07-30 | Stop reason: HOSPADM

## 2022-07-28 RX ORDER — ONDANSETRON 2 MG/ML
4 INJECTION INTRAMUSCULAR; INTRAVENOUS 2 TIMES DAILY PRN
Status: DISCONTINUED | OUTPATIENT
Start: 2022-07-28 | End: 2022-07-30 | Stop reason: HOSPADM

## 2022-07-28 RX ORDER — HYDRALAZINE HYDROCHLORIDE 20 MG/ML
10 INJECTION INTRAMUSCULAR; INTRAVENOUS EVERY 6 HOURS PRN
Status: DISCONTINUED | OUTPATIENT
Start: 2022-07-28 | End: 2022-07-30 | Stop reason: HOSPADM

## 2022-07-28 RX ORDER — CARVEDILOL 12.5 MG/1
25 TABLET ORAL 2 TIMES DAILY WITH MEALS
Status: DISCONTINUED | OUTPATIENT
Start: 2022-07-29 | End: 2022-07-30 | Stop reason: HOSPADM

## 2022-07-28 RX ORDER — ROSUVASTATIN CALCIUM 20 MG/1
10 TABLET, COATED ORAL DAILY
Status: DISCONTINUED | OUTPATIENT
Start: 2022-07-29 | End: 2022-07-30 | Stop reason: HOSPADM

## 2022-07-28 RX ORDER — 0.9 % SODIUM CHLORIDE 0.9 %
2 VIAL (ML) INJECTION EVERY 12 HOURS SCHEDULED
Status: DISCONTINUED | OUTPATIENT
Start: 2022-07-28 | End: 2022-07-30 | Stop reason: HOSPADM

## 2022-07-28 RX ORDER — ACETAMINOPHEN 325 MG/1
650 TABLET ORAL EVERY 4 HOURS PRN
Status: DISCONTINUED | OUTPATIENT
Start: 2022-07-28 | End: 2022-07-30 | Stop reason: HOSPADM

## 2022-07-28 RX ORDER — LANOLIN ALCOHOL/MO/W.PET/CERES
3 CREAM (GRAM) TOPICAL NIGHTLY PRN
Status: DISCONTINUED | OUTPATIENT
Start: 2022-07-28 | End: 2022-07-30 | Stop reason: HOSPADM

## 2022-07-28 RX ADMIN — Medication 3 MG: at 23:52

## 2022-07-28 RX ADMIN — IOHEXOL 75 ML: 350 INJECTION, SOLUTION INTRAVENOUS at 17:51

## 2022-07-28 RX ADMIN — VANCOMYCIN HYDROCHLORIDE 750 MG: 750 INJECTION, POWDER, LYOPHILIZED, FOR SOLUTION INTRAVENOUS at 22:56

## 2022-07-28 RX ADMIN — SODIUM CHLORIDE, PRESERVATIVE FREE 2 ML: 5 INJECTION INTRAVENOUS at 22:56

## 2022-07-28 RX ADMIN — CEFEPIME HYDROCHLORIDE 2000 MG: 2 INJECTION, POWDER, FOR SOLUTION INTRAVENOUS at 21:49

## 2022-07-28 ASSESSMENT — ENCOUNTER SYMPTOMS
SHORTNESS OF BREATH: 0
NUMBNESS: 0
CHILLS: 0
DIZZINESS: 0
ABDOMINAL PAIN: 0
FEVER: 0
COUGH: 0

## 2022-07-28 ASSESSMENT — PAIN SCALES - GENERAL
PAINLEVEL_OUTOF10: 4
PAINLEVEL_OUTOF10: 0

## 2022-07-29 ENCOUNTER — APPOINTMENT (OUTPATIENT)
Dept: MRI IMAGING | Age: 77
DRG: 863 | End: 2022-07-29
Attending: FAMILY MEDICINE

## 2022-07-29 LAB
ANION GAP SERPL CALC-SCNC: 9 MMOL/L (ref 7–19)
BUN SERPL-MCNC: 26 MG/DL (ref 6–20)
BUN/CREAT SERPL: 35 (ref 7–25)
CALCIUM SERPL-MCNC: 8.8 MG/DL (ref 8.4–10.2)
CHLORIDE SERPL-SCNC: 114 MMOL/L (ref 97–110)
CO2 SERPL-SCNC: 23 MMOL/L (ref 21–32)
CREAT SERPL-MCNC: 0.75 MG/DL (ref 0.67–1.17)
DEPRECATED RDW RBC: 51 FL (ref 39–50)
ERYTHROCYTE [DISTWIDTH] IN BLOOD: 14.3 % (ref 11–15)
FASTING DURATION TIME PATIENT: ABNORMAL H
GFR SERPLBLD BASED ON 1.73 SQ M-ARVRAT: >90 ML/MIN
GLUCOSE SERPL-MCNC: 67 MG/DL (ref 70–99)
HCT VFR BLD CALC: 32.8 % (ref 39–51)
HGB BLD-MCNC: 11 G/DL (ref 13–17)
MAGNESIUM SERPL-MCNC: 2 MG/DL (ref 1.7–2.4)
MCH RBC QN AUTO: 32.6 PG (ref 26–34)
MCHC RBC AUTO-ENTMCNC: 33.5 G/DL (ref 32–36.5)
MCV RBC AUTO: 97.3 FL (ref 78–100)
NRBC BLD MANUAL-RTO: 0 /100 WBC
PLATELET # BLD AUTO: 182 K/MCL (ref 140–450)
POTASSIUM SERPL-SCNC: 3.7 MMOL/L (ref 3.4–5.1)
RAINBOW EXTRA TUBES HOLD SPECIMEN: NORMAL
RBC # BLD: 3.37 MIL/MCL (ref 4.5–5.9)
SODIUM SERPL-SCNC: 142 MMOL/L (ref 135–145)
WBC # BLD: 6.1 K/MCL (ref 4.2–11)

## 2022-07-29 PROCEDURE — 10002803 HB RX 637: Performed by: FAMILY MEDICINE

## 2022-07-29 PROCEDURE — 10002807 HB RX 258: Performed by: INTERNAL MEDICINE

## 2022-07-29 PROCEDURE — 10002805 HB CONTRAST AGENT: Performed by: FAMILY MEDICINE

## 2022-07-29 PROCEDURE — 10004651 HB RX, NO CHARGE ITEM: Performed by: INTERNAL MEDICINE

## 2022-07-29 PROCEDURE — 80048 BASIC METABOLIC PNL TOTAL CA: CPT | Performed by: INTERNAL MEDICINE

## 2022-07-29 PROCEDURE — 83735 ASSAY OF MAGNESIUM: CPT | Performed by: INTERNAL MEDICINE

## 2022-07-29 PROCEDURE — 72197 MRI PELVIS W/O & W/DYE: CPT

## 2022-07-29 PROCEDURE — 36415 COLL VENOUS BLD VENIPUNCTURE: CPT | Performed by: INTERNAL MEDICINE

## 2022-07-29 PROCEDURE — 10002803 HB RX 637: Performed by: INTERNAL MEDICINE

## 2022-07-29 PROCEDURE — 10002801 HB RX 250 W/O HCPCS: Performed by: INTERNAL MEDICINE

## 2022-07-29 PROCEDURE — X1094 NO CHARGE VISIT: HCPCS | Performed by: FAMILY MEDICINE

## 2022-07-29 PROCEDURE — G1004 CDSM NDSC: HCPCS

## 2022-07-29 PROCEDURE — 99222 1ST HOSP IP/OBS MODERATE 55: CPT | Performed by: NURSE PRACTITIONER

## 2022-07-29 PROCEDURE — 10000002 HB ROOM CHARGE MED SURG

## 2022-07-29 PROCEDURE — 10002800 HB RX 250 W HCPCS: Performed by: INTERNAL MEDICINE

## 2022-07-29 PROCEDURE — 10002803 HB RX 637: Performed by: EMERGENCY MEDICINE

## 2022-07-29 PROCEDURE — 85027 COMPLETE CBC AUTOMATED: CPT | Performed by: INTERNAL MEDICINE

## 2022-07-29 PROCEDURE — A9585 GADOBUTROL INJECTION: HCPCS | Performed by: FAMILY MEDICINE

## 2022-07-29 RX ORDER — CHLORAL HYDRATE 500 MG
3 CAPSULE ORAL DAILY
COMMUNITY

## 2022-07-29 RX ORDER — GADOBUTROL 604.72 MG/ML
7.5 INJECTION INTRAVENOUS ONCE
Status: COMPLETED | OUTPATIENT
Start: 2022-07-29 | End: 2022-07-29

## 2022-07-29 RX ORDER — POTASSIUM CHLORIDE 20 MEQ/1
40 TABLET, EXTENDED RELEASE ORAL ONCE
Status: COMPLETED | OUTPATIENT
Start: 2022-07-29 | End: 2022-07-29

## 2022-07-29 RX ORDER — TRAZODONE HYDROCHLORIDE 50 MG/1
50 TABLET ORAL NIGHTLY PRN
Status: DISCONTINUED | OUTPATIENT
Start: 2022-07-29 | End: 2022-07-30 | Stop reason: HOSPADM

## 2022-07-29 RX ADMIN — GADOBUTROL 7.5 ML: 604.72 INJECTION INTRAVENOUS at 16:13

## 2022-07-29 RX ADMIN — CEFEPIME 1000 MG: 1 INJECTION, POWDER, FOR SOLUTION INTRAMUSCULAR; INTRAVENOUS at 22:08

## 2022-07-29 RX ADMIN — ROSUVASTATIN CALCIUM 10 MG: 20 TABLET, FILM COATED ORAL at 11:33

## 2022-07-29 RX ADMIN — SODIUM CHLORIDE, PRESERVATIVE FREE 2 ML: 5 INJECTION INTRAVENOUS at 08:40

## 2022-07-29 RX ADMIN — CARVEDILOL 25 MG: 25 TABLET, FILM COATED ORAL at 19:08

## 2022-07-29 RX ADMIN — SODIUM CHLORIDE 25 ML: 9 INJECTION, SOLUTION INTRAVENOUS at 22:08

## 2022-07-29 RX ADMIN — RIVAROXABAN 20 MG: 20 TABLET, FILM COATED ORAL at 18:54

## 2022-07-29 RX ADMIN — Medication 3 MG: at 21:57

## 2022-07-29 RX ADMIN — CEFEPIME 1000 MG: 1 INJECTION, POWDER, FOR SOLUTION INTRAMUSCULAR; INTRAVENOUS at 06:01

## 2022-07-29 RX ADMIN — SODIUM CHLORIDE, PRESERVATIVE FREE 2 ML: 5 INJECTION INTRAVENOUS at 21:57

## 2022-07-29 RX ADMIN — CARVEDILOL 25 MG: 25 TABLET, FILM COATED ORAL at 11:33

## 2022-07-29 RX ADMIN — CEFEPIME 1000 MG: 1 INJECTION, POWDER, FOR SOLUTION INTRAMUSCULAR; INTRAVENOUS at 14:15

## 2022-07-29 RX ADMIN — TRAZODONE HYDROCHLORIDE 50 MG: 50 TABLET ORAL at 21:57

## 2022-07-29 RX ADMIN — POTASSIUM CHLORIDE 40 MEQ: 1500 TABLET, EXTENDED RELEASE ORAL at 12:35

## 2022-07-29 ASSESSMENT — PATIENT HEALTH QUESTIONNAIRE - PHQ9
SUM OF ALL RESPONSES TO PHQ9 QUESTIONS 1 AND 2: 0
1. LITTLE INTEREST OR PLEASURE IN DOING THINGS: NOT AT ALL
CLINICAL INTERPRETATION OF PHQ2 SCORE: NO FURTHER SCREENING NEEDED
SUM OF ALL RESPONSES TO PHQ9 QUESTIONS 1 AND 2: 0
2. FEELING DOWN, DEPRESSED OR HOPELESS: NOT AT ALL
IS PATIENT ABLE TO COMPLETE PHQ2 OR PHQ9: YES

## 2022-07-29 ASSESSMENT — COLUMBIA-SUICIDE SEVERITY RATING SCALE - C-SSRS
2. HAVE YOU ACTUALLY HAD ANY THOUGHTS OF KILLING YOURSELF?: NO
6. HAVE YOU EVER DONE ANYTHING, STARTED TO DO ANYTHING, OR PREPARED TO DO ANYTHING TO END YOUR LIFE?: NO
IS THE PATIENT ABLE TO COMPLETE C-SSRS: YES
1. WITHIN THE PAST MONTH, HAVE YOU WISHED YOU WERE DEAD OR WISHED YOU COULD GO TO SLEEP AND NOT WAKE UP?: NO

## 2022-07-29 ASSESSMENT — COGNITIVE AND FUNCTIONAL STATUS - GENERAL
DO YOU HAVE SERIOUS DIFFICULTY WALKING OR CLIMBING STAIRS: NO
ARE YOU DEAF OR DO YOU HAVE SERIOUS DIFFICULTY  HEARING: NO
DO YOU HAVE DIFFICULTY DRESSING OR BATHING: NO
BECAUSE OF A PHYSICAL, MENTAL, OR EMOTIONAL CONDITION, DO YOU HAVE DIFFICULTY DOING ERRANDS ALONE: NO
BECAUSE OF A PHYSICAL, MENTAL, OR EMOTIONAL CONDITION, DO YOU HAVE SERIOUS DIFFICULTY CONCENTRATING, REMEMBERING OR MAKING DECISIONS: NO
ARE YOU BLIND OR DO YOU HAVE SERIOUS DIFFICULTY SEEING, EVEN WHEN WEARING GLASSES: NO
DO YOU HAVE DIFFICULTY DRESSING OR BATHING: NO
BECAUSE OF A PHYSICAL, MENTAL, OR EMOTIONAL CONDITION, DO YOU HAVE DIFFICULTY DOING ERRANDS ALONE: NO
BECAUSE OF A PHYSICAL, MENTAL, OR EMOTIONAL CONDITION, DO YOU HAVE SERIOUS DIFFICULTY CONCENTRATING, REMEMBERING OR MAKING DECISIONS: NO
DO YOU HAVE SERIOUS DIFFICULTY WALKING OR CLIMBING STAIRS: NO

## 2022-07-29 ASSESSMENT — ACTIVITIES OF DAILY LIVING (ADL)
RECENT_DECLINE_ADL: NO
ADL_SCORE: 12
ADL_BEFORE_ADMISSION: INDEPENDENT
ADL_SHORT_OF_BREATH: NO
CHRONIC_PAIN_PRESENT: NO

## 2022-07-29 ASSESSMENT — LIFESTYLE VARIABLES
HOW OFTEN DO YOU HAVE A DRINK CONTAINING ALCOHOL: MONTHLY OR LESS
HOW OFTEN DO YOU HAVE 6 OR MORE DRINKS ON ONE OCCASION: NEVER
ALCOHOL_USE_STATUS: NO OR LOW RISK WITH VALIDATED TOOL
AUDIT-C TOTAL SCORE: 1
HOW MANY STANDARD DRINKS CONTAINING ALCOHOL DO YOU HAVE ON A TYPICAL DAY: 0,1 OR 2

## 2022-07-29 ASSESSMENT — PAIN SCALES - GENERAL
PAINLEVEL_OUTOF10: 0
PAINLEVEL_OUTOF10: 0

## 2022-07-30 VITALS
HEIGHT: 69 IN | OXYGEN SATURATION: 99 % | SYSTOLIC BLOOD PRESSURE: 118 MMHG | DIASTOLIC BLOOD PRESSURE: 65 MMHG | RESPIRATION RATE: 16 BRPM | HEART RATE: 87 BPM | WEIGHT: 152.78 LBS | TEMPERATURE: 97.3 F | BODY MASS INDEX: 22.63 KG/M2

## 2022-07-30 PROBLEM — M46.28 SACRAL OSTEOMYELITIS (CMD): Status: RESOLVED | Noted: 2022-07-28 | Resolved: 2022-07-30

## 2022-07-30 LAB — POTASSIUM SERPL-SCNC: 4 MMOL/L (ref 3.4–5.1)

## 2022-07-30 PROCEDURE — 10004651 HB RX, NO CHARGE ITEM: Performed by: INTERNAL MEDICINE

## 2022-07-30 PROCEDURE — 10002800 HB RX 250 W HCPCS: Performed by: INTERNAL MEDICINE

## 2022-07-30 PROCEDURE — 36415 COLL VENOUS BLD VENIPUNCTURE: CPT | Performed by: EMERGENCY MEDICINE

## 2022-07-30 PROCEDURE — 10002807 HB RX 258: Performed by: INTERNAL MEDICINE

## 2022-07-30 PROCEDURE — 84132 ASSAY OF SERUM POTASSIUM: CPT | Performed by: EMERGENCY MEDICINE

## 2022-07-30 PROCEDURE — 99239 HOSP IP/OBS DSCHRG MGMT >30: CPT | Performed by: FAMILY MEDICINE

## 2022-07-30 PROCEDURE — 10002801 HB RX 250 W/O HCPCS: Performed by: INTERNAL MEDICINE

## 2022-07-30 PROCEDURE — 10002803 HB RX 637: Performed by: INTERNAL MEDICINE

## 2022-07-30 RX ADMIN — CEFEPIME 1000 MG: 1 INJECTION, POWDER, FOR SOLUTION INTRAMUSCULAR; INTRAVENOUS at 06:01

## 2022-07-30 RX ADMIN — VANCOMYCIN HYDROCHLORIDE 1250 MG: 10 INJECTION, POWDER, LYOPHILIZED, FOR SOLUTION INTRAVENOUS at 16:41

## 2022-07-30 RX ADMIN — RIVAROXABAN 20 MG: 20 TABLET, FILM COATED ORAL at 19:02

## 2022-07-30 RX ADMIN — SODIUM CHLORIDE, PRESERVATIVE FREE 2 ML: 5 INJECTION INTRAVENOUS at 08:46

## 2022-07-30 RX ADMIN — ROSUVASTATIN CALCIUM 10 MG: 20 TABLET, FILM COATED ORAL at 08:43

## 2022-07-30 RX ADMIN — CEFEPIME 1000 MG: 1 INJECTION, POWDER, FOR SOLUTION INTRAMUSCULAR; INTRAVENOUS at 14:10

## 2022-07-30 RX ADMIN — CARVEDILOL 25 MG: 25 TABLET, FILM COATED ORAL at 08:43

## 2022-07-30 RX ADMIN — CARVEDILOL 25 MG: 25 TABLET, FILM COATED ORAL at 19:02

## 2022-07-30 ASSESSMENT — PAIN SCALES - GENERAL: PAINLEVEL_OUTOF10: 0

## 2022-08-02 LAB
BACTERIA BLD CULT: NORMAL
BACTERIA BLD CULT: NORMAL
BACTERIA SPEC ANAEROBE+AEROBE CULT: ABNORMAL
BACTERIA SPEC ANAEROBE+AEROBE CULT: ABNORMAL
GRAM STN SPEC: ABNORMAL

## 2022-09-01 ENCOUNTER — ANCILLARY PROCEDURE (OUTPATIENT)
Dept: CARDIOLOGY | Age: 77
End: 2022-09-01
Attending: INTERNAL MEDICINE

## 2022-09-01 DIAGNOSIS — I48.20 CHRONIC ATRIAL FIBRILLATION (CMD): ICD-10-CM

## 2022-09-01 LAB
AORTIC VALVE AREA: 3.11
ASCENDING AORTA (AAD): 3.7
AV MEAN GRADIENT (AVMG): 2
AV MEAN VELOCITY (AVMV): 0.65
AV PEAK GRADIENT (AVPG): 4
AV PEAK VELOCITY (AVPV): 0.98
AV STENOSIS SEVERITY TEXT: NORMAL
DOP CALC LVOT PEAK VEL (LVOTPV): 0.77
E WAVE DECELARATION TIME (MDT): 190
EST RIGHT VENT SYSTOLIC PRESSURE BY TRICUSPID REGURGITATION JET (RVSP): 35.47
INTERVENTRICULAR SEPTUM IN END DIASTOLE (IVSD): 0.9
LEFT INTERNAL DIMENSION IN SYSTOLE (LVSD): 3.3
LEFT VENTRICULAR INTERNAL DIMENSION IN DIASTOLE (LVDD): 5.3
LEFT VENTRICULAR POSTERIOR WALL IN END DIASTOLE (LVPW): 1
LV EF: NORMAL %
LVOT 2D (LVOTD): 2.3
LVOT VTI (LVOTVTI): 17.1
MV E TISSUE VEL LAT (MELV): 13
MV E TISSUE VEL MED (MESV): 11.4
MV E WAVE VEL/E TISSUE VEL MED(MSR): 9.04
MV PEAK E VELOCITY (MVPEV): 1.03
PV PEAK VELOCITY (PVPV): 0.7
TRICUSPID ANNULAR PLANE SYSTOLIC EXCURSION (TAPSE): 2.19
TRICUSPID VALVE ANNULAR PEAK VELOCITY (TVAPV): 7.98
TRICUSPID VALVE PEAK REGURGITATION VELOCITY (TRPV): 2.76
TV ESTIMATED RIGHT ARTERIAL PRESSURE (RAP): 5

## 2022-09-01 PROCEDURE — 93306 TTE W/DOPPLER COMPLETE: CPT | Performed by: INTERNAL MEDICINE

## 2022-09-01 PROCEDURE — 93356 MYOCRD STRAIN IMG SPCKL TRCK: CPT | Performed by: INTERNAL MEDICINE

## 2022-09-01 PROCEDURE — 76376 3D RENDER W/INTRP POSTPROCES: CPT | Performed by: INTERNAL MEDICINE

## 2022-09-06 ENCOUNTER — TELEPHONE (OUTPATIENT)
Dept: CARDIOLOGY | Age: 77
End: 2022-09-06

## 2022-09-08 DIAGNOSIS — E78.5 DYSLIPIDEMIA: ICD-10-CM

## 2022-09-08 RX ORDER — ROSUVASTATIN CALCIUM 10 MG/1
10 TABLET, COATED ORAL DAILY
Qty: 90 TABLET | Refills: 1 | Status: SHIPPED | OUTPATIENT
Start: 2022-09-08 | End: 2023-06-23 | Stop reason: SDUPTHER

## 2022-12-05 RX ORDER — CARVEDILOL 25 MG/1
TABLET ORAL
Qty: 180 TABLET | Refills: 3 | Status: SHIPPED | OUTPATIENT
Start: 2022-12-05

## 2022-12-22 ENCOUNTER — TELEPHONE (OUTPATIENT)
Dept: CARDIOLOGY | Age: 77
End: 2022-12-22

## 2023-01-25 RX ORDER — SILDENAFIL CITRATE 20 MG/1
20 TABLET ORAL PRN
Qty: 30 TABLET | Refills: 0 | Status: SHIPPED | OUTPATIENT
Start: 2023-01-25 | End: 2023-01-31 | Stop reason: SDUPTHER

## 2023-01-27 ASSESSMENT — PATIENT HEALTH QUESTIONNAIRE - PHQ9
SUM OF ALL RESPONSES TO PHQ9 QUESTIONS 1 AND 2: 0
CLINICAL INTERPRETATION OF PHQ2 SCORE: NO FURTHER SCREENING NEEDED
1. LITTLE INTEREST OR PLEASURE IN DOING THINGS: NOT AT ALL
SUM OF ALL RESPONSES TO PHQ9 QUESTIONS 1 AND 2: 0
2. FEELING DOWN, DEPRESSED OR HOPELESS: NOT AT ALL

## 2023-01-31 ENCOUNTER — OFFICE VISIT (OUTPATIENT)
Dept: CARDIOLOGY | Age: 78
End: 2023-01-31

## 2023-01-31 VITALS
HEIGHT: 68 IN | DIASTOLIC BLOOD PRESSURE: 72 MMHG | HEART RATE: 73 BPM | SYSTOLIC BLOOD PRESSURE: 155 MMHG | WEIGHT: 155.75 LBS | BODY MASS INDEX: 23.61 KG/M2

## 2023-01-31 DIAGNOSIS — Z79.01 LONG TERM (CURRENT) USE OF ANTICOAGULANTS: ICD-10-CM

## 2023-01-31 DIAGNOSIS — I45.10 RBBB: ICD-10-CM

## 2023-01-31 DIAGNOSIS — Z01.810 PREOP CARDIOVASCULAR EXAM: Primary | ICD-10-CM

## 2023-01-31 DIAGNOSIS — I73.9 PERIPHERAL VASCULAR DISEASE (CMD): ICD-10-CM

## 2023-01-31 DIAGNOSIS — I08.0 MILD MITRAL AND AORTIC REGURGITATION: ICD-10-CM

## 2023-01-31 DIAGNOSIS — E78.5 DYSLIPIDEMIA: ICD-10-CM

## 2023-01-31 DIAGNOSIS — I77.1 CELIAC ARTERY STENOSIS (CMD): ICD-10-CM

## 2023-01-31 DIAGNOSIS — I48.20 CHRONIC ATRIAL FIBRILLATION (CMD): ICD-10-CM

## 2023-01-31 PROCEDURE — 3078F DIAST BP <80 MM HG: CPT | Performed by: INTERNAL MEDICINE

## 2023-01-31 PROCEDURE — 99214 OFFICE O/P EST MOD 30 MIN: CPT | Performed by: INTERNAL MEDICINE

## 2023-01-31 PROCEDURE — 3077F SYST BP >= 140 MM HG: CPT | Performed by: INTERNAL MEDICINE

## 2023-01-31 RX ORDER — SILDENAFIL CITRATE 20 MG/1
20 TABLET ORAL PRN
Qty: 90 TABLET | Refills: 3 | Status: SHIPPED | OUTPATIENT
Start: 2023-01-31

## 2023-01-31 SDOH — HEALTH STABILITY: MENTAL HEALTH: DEPRESSION SCREENING SCORE: 0

## 2023-01-31 SDOH — HEALTH STABILITY: PHYSICAL HEALTH: ON AVERAGE, HOW MANY MINUTES DO YOU ENGAGE IN EXERCISE AT THIS LEVEL?: 40 MIN

## 2023-01-31 SDOH — HEALTH STABILITY: MENTAL HEALTH: PHQ2 INTERPRETATION: NO FURTHER SCREENING NEEDED

## 2023-01-31 SDOH — HEALTH STABILITY: PHYSICAL HEALTH: ON AVERAGE, HOW MANY DAYS PER WEEK DO YOU ENGAGE IN MODERATE TO STRENUOUS EXERCISE (LIKE A BRISK WALK)?: 5 DAYS

## 2023-01-31 SDOH — HEALTH STABILITY: MENTAL HEALTH: FEELING DOWN, DEPRESSED OR HOPELESS?: NOT AT ALL

## 2023-01-31 SDOH — HEALTH STABILITY: MENTAL HEALTH: LITTLE INTEREST OR PLEASURE IN ACTIVITY?: NOT AT ALL

## 2023-01-31 ASSESSMENT — ENCOUNTER SYMPTOMS
BRUISES/BLEEDS EASILY: 0
HEMATOCHEZIA: 0
WEAKNESS: 0
WEIGHT LOSS: 0
DOUBLE VISION: 0
DEPRESSION: 0
ALLERGIC/IMMUNOLOGIC COMMENTS: NO NEW FOOD ALLERGIES
COUGH: 0
WEIGHT GAIN: 0
SUSPICIOUS LESIONS: 0
SYNCOPE: 0
SHORTNESS OF BREATH: 0
FEVER: 0
NEAR-SYNCOPE: 0
BACK PAIN: 1
HEADACHES: 0
CHILLS: 0
HEMOPTYSIS: 0
FOCAL WEAKNESS: 0
LIGHT-HEADEDNESS: 0

## 2023-01-31 ASSESSMENT — PATIENT HEALTH QUESTIONNAIRE - PHQ9: SUM OF ALL RESPONSES TO PHQ9 QUESTIONS 1 AND 2: 0

## 2023-02-03 ENCOUNTER — CLINICAL DOCUMENTATION (OUTPATIENT)
Dept: CARDIOLOGY | Age: 78
End: 2023-02-03

## 2023-02-10 ENCOUNTER — TELEPHONE (OUTPATIENT)
Dept: CARDIOLOGY | Age: 78
End: 2023-02-10

## 2023-03-03 DIAGNOSIS — I48.20 CHRONIC ATRIAL FIBRILLATION (CMD): ICD-10-CM

## 2023-03-03 RX ORDER — RIVAROXABAN 20 MG/1
TABLET, FILM COATED ORAL
Qty: 90 TABLET | Refills: 3 | Status: SHIPPED | OUTPATIENT
Start: 2023-03-03

## 2023-03-30 ENCOUNTER — CLINICAL DOCUMENTATION (OUTPATIENT)
Dept: CARDIOLOGY | Age: 78
End: 2023-03-30

## 2023-03-30 ENCOUNTER — EXTERNAL RECORD (OUTPATIENT)
Dept: HEALTH INFORMATION MANAGEMENT | Facility: OTHER | Age: 78
End: 2023-03-30

## 2023-06-23 DIAGNOSIS — E78.5 DYSLIPIDEMIA: ICD-10-CM

## 2023-06-23 RX ORDER — ROSUVASTATIN CALCIUM 10 MG/1
10 TABLET, COATED ORAL DAILY
Qty: 90 TABLET | Refills: 0 | Status: SHIPPED | OUTPATIENT
Start: 2023-06-23 | End: 2023-09-19

## 2023-08-04 ENCOUNTER — TELEPHONE (OUTPATIENT)
Dept: CARDIOLOGY | Age: 78
End: 2023-08-04

## 2023-08-07 ENCOUNTER — CLINICAL DOCUMENTATION (OUTPATIENT)
Dept: CARDIOLOGY | Age: 78
End: 2023-08-07

## 2023-09-18 DIAGNOSIS — E78.5 DYSLIPIDEMIA: ICD-10-CM

## 2023-09-19 RX ORDER — ROSUVASTATIN CALCIUM 10 MG/1
10 TABLET, COATED ORAL DAILY
Qty: 90 TABLET | Refills: 0 | Status: SHIPPED | OUTPATIENT
Start: 2023-09-19

## 2023-11-28 RX ORDER — CARVEDILOL 25 MG/1
TABLET ORAL
Qty: 180 TABLET | Refills: 1 | Status: SHIPPED | OUTPATIENT
Start: 2023-11-28

## 2023-12-14 DIAGNOSIS — E78.5 DYSLIPIDEMIA: ICD-10-CM

## 2023-12-14 RX ORDER — ROSUVASTATIN CALCIUM 10 MG/1
10 TABLET, COATED ORAL DAILY
Qty: 30 TABLET | Refills: 0 | Status: SHIPPED | OUTPATIENT
Start: 2023-12-14

## 2024-01-17 DIAGNOSIS — E78.5 DYSLIPIDEMIA: ICD-10-CM

## 2024-01-17 RX ORDER — ROSUVASTATIN CALCIUM 10 MG/1
10 TABLET, COATED ORAL DAILY
Qty: 30 TABLET | Refills: 0 | Status: SHIPPED | OUTPATIENT
Start: 2024-01-17

## 2024-01-25 ENCOUNTER — EXTERNAL LAB (OUTPATIENT)
Dept: OTHER | Age: 79
End: 2024-01-25

## 2024-01-25 ENCOUNTER — EXTERNAL LAB (OUTPATIENT)
Dept: CARDIOLOGY | Age: 79
End: 2024-01-25

## 2024-01-25 LAB
25(OH)D3+25(OH)D2 SERPL-MCNC: 64.5 NG/ML (ref 30–100)
ABSOLUTE IMMATURE GRANULOCYTES (OFFPRE24): 0 X10E3/UL (ref 0–0.1)
ALBUMIN SERPL-MCNC: 4.6 G/DL (ref 3.8–4.8)
ALBUMIN/GLOB SERPL: 1.7 {RATIO} (ref 1.2–2.2)
ALP SERPL-CCNC: 75 IU/L (ref 44–121)
ALT SERPL-CCNC: 34 IU/L (ref 0–44)
AST SERPL-CCNC: 45 IU/L (ref 0–40)
BASOPHILS # BLD: 0 X10E3/UL (ref 0–0.2)
BASOPHILS NFR BLD: 0 %
BILIRUB SERPL-MCNC: 0.4 MG/DL (ref 0–1.2)
BUN SERPL-MCNC: 31 MG/DL (ref 8–27)
BUN/CREAT SERPL: 32 (ref 10–24)
CALCIUM SERPL-MCNC: 9.7 MG/DL (ref 8.6–10.2)
CHLORIDE SERPL-SCNC: 112 MMOL/L (ref 96–106)
CHOLEST SERPL-MCNC: 133 MG/DL (ref 100–199)
CO2 SERPL-SCNC: 19 MMOL/L (ref 20–29)
CREAT SERPL-MCNC: 0.96 MG/DL (ref 0.76–1.27)
CRP SERPL HS-MCNC: 0.54 MG/L (ref 0–3)
EOSINOPHIL # BLD: 0.1 X10E3/UL (ref 0–0.4)
EOSINOPHIL NFR BLD: 2 %
ERYTHROCYTE [DISTWIDTH] IN BLOOD: 13.4 % (ref 11.6–15.4)
GFR SERPLBLD SCHWARTZ-ARVRAT: 81 ML/MIN/1.73
GLOBULIN SER-MCNC: 2.7 G/DL (ref 1.5–4.5)
GLUCOSE SERPL-MCNC: 94 MG/DL (ref 70–99)
HCT VFR BLD CALC: 37.5 % (ref 37.5–51)
HDLC SERPL-MCNC: 64 MG/DL
HGB BLD-MCNC: 12.5 G/DL (ref 13–17.7)
IMMATURE GRANULOCYTES (OFFPRE25): 1 %
IRON SERPL-MCNC: 82 UG/DL (ref 38–169)
LAB RESULT: NORMAL
LDH SERPL L TO P-CCNC: 220 IU/L (ref 121–224)
LDL/HDL (OFFPRE3): 0.9 RATIO (ref 0–3.6)
LDLC SERPL CALC-MCNC: 58 MG/DL (ref 0–99)
LENGTH OF FAST TIME PATIENT: NORMAL H
LENGTH OF FAST TIME PATIENT: YES H
LYMPHOCYTES # BLD: 1.3 X10E3/UL (ref 0.7–3.1)
LYMPHOCYTES NFR BLD: 22 %
MCH RBC QN AUTO: 31.6 PG (ref 26.6–33)
MCHC RBC AUTO-ENTMCNC: 33.3 G/DL (ref 31.5–35.7)
MCV RBC AUTO: 95 FL (ref 79–97)
MONOCYTES # BLD: 0.6 X10E3/UL (ref 0.1–0.9)
MONOCYTES NFR BLD: 10 %
NEUTROPHILS # BLD: 3.9 X10E3/UL (ref 1.4–7)
NEUTROPHILS NFR BLD: 65 %
PHOSPHATE SERPL-MCNC: 4.3 MG/DL (ref 2.8–4.1)
PLATELET # BLD: 185 X10E3/UL (ref 150–450)
POTASSIUM SERPL-SCNC: 4.4 MMOL/L (ref 3.5–5.2)
PROT SERPL-MCNC: 7.3 G/DL (ref 6–8.5)
PSA SERPL-MCNC: 0.4 NG/ML (ref 0–4)
RBC # BLD: 3.96 X10E6/UL (ref 4.14–5.8)
SODIUM SERPL-SCNC: 146 MMOL/L (ref 134–144)
TRIGL SERPL-MCNC: 49 MG/DL (ref 0–149)
URATE SERPL-MCNC: 3.9 MG/DL (ref 3.8–8.4)
VLDLC SERPL CALC-MCNC: 11 MG/DL (ref 5–40)
WBC # BLD: 5.8 X10E3/UL (ref 3.4–10.8)

## 2024-02-01 ENCOUNTER — APPOINTMENT (OUTPATIENT)
Dept: CARDIOLOGY | Age: 79
End: 2024-02-01

## 2024-02-01 ENCOUNTER — TELEPHONE (OUTPATIENT)
Dept: CARDIOLOGY | Age: 79
End: 2024-02-01

## 2024-02-01 VITALS
HEIGHT: 68 IN | BODY MASS INDEX: 22.72 KG/M2 | DIASTOLIC BLOOD PRESSURE: 78 MMHG | SYSTOLIC BLOOD PRESSURE: 129 MMHG | WEIGHT: 149.91 LBS | OXYGEN SATURATION: 99 % | HEART RATE: 79 BPM

## 2024-02-01 DIAGNOSIS — I08.0 MILD MITRAL AND AORTIC REGURGITATION: ICD-10-CM

## 2024-02-01 DIAGNOSIS — I48.20 CHRONIC ATRIAL FIBRILLATION (CMD): Primary | ICD-10-CM

## 2024-02-01 DIAGNOSIS — I10 ESSENTIAL HYPERTENSION: ICD-10-CM

## 2024-02-01 DIAGNOSIS — I73.9 PERIPHERAL VASCULAR DISEASE (CMD): ICD-10-CM

## 2024-02-01 DIAGNOSIS — R53.83 OTHER FATIGUE: ICD-10-CM

## 2024-02-01 DIAGNOSIS — E78.5 DYSLIPIDEMIA: ICD-10-CM

## 2024-02-01 DIAGNOSIS — I45.10 RBBB: ICD-10-CM

## 2024-02-01 DIAGNOSIS — Z79.01 LONG TERM (CURRENT) USE OF ANTICOAGULANTS: ICD-10-CM

## 2024-02-01 DIAGNOSIS — I77.1 CELIAC ARTERY STENOSIS (CMD): ICD-10-CM

## 2024-02-01 SDOH — HEALTH STABILITY: PHYSICAL HEALTH: ON AVERAGE, HOW MANY DAYS PER WEEK DO YOU ENGAGE IN MODERATE TO STRENUOUS EXERCISE (LIKE A BRISK WALK)?: 0 DAYS

## 2024-02-01 SDOH — HEALTH STABILITY: PHYSICAL HEALTH: ON AVERAGE, HOW MANY MINUTES DO YOU ENGAGE IN EXERCISE AT THIS LEVEL?: 0 MIN

## 2024-02-01 ASSESSMENT — ENCOUNTER SYMPTOMS
BRUISES/BLEEDS EASILY: 0
HEMOPTYSIS: 0
COUGH: 0
SUSPICIOUS LESIONS: 0
HEMATOCHEZIA: 0
WEAKNESS: 1
CHILLS: 0
SHORTNESS OF BREATH: 0
SYNCOPE: 0
WEIGHT LOSS: 0
FEVER: 0
NEAR-SYNCOPE: 0
WEIGHT GAIN: 0
BACK PAIN: 1
LOSS OF BALANCE: 0
DEPRESSION: 0
ALLERGIC/IMMUNOLOGIC COMMENTS: NO NEW FOOD ALLERGIES
DOUBLE VISION: 0

## 2024-02-01 ASSESSMENT — PATIENT HEALTH QUESTIONNAIRE - PHQ9
1. LITTLE INTEREST OR PLEASURE IN DOING THINGS: NOT AT ALL
SUM OF ALL RESPONSES TO PHQ9 QUESTIONS 1 AND 2: 0
CLINICAL INTERPRETATION OF PHQ2 SCORE: NO FURTHER SCREENING NEEDED
IS THE PATIENT ABLE TO COGNITIVELY COMPLETE THE PHQ9: NO
SUM OF ALL RESPONSES TO PHQ9 QUESTIONS 1 AND 2: 0
2. FEELING DOWN, DEPRESSED OR HOPELESS: NOT AT ALL

## 2024-02-15 ENCOUNTER — APPOINTMENT (OUTPATIENT)
Dept: CARDIOLOGY | Age: 79
End: 2024-02-15
Attending: INTERNAL MEDICINE

## 2024-02-15 ENCOUNTER — TELEPHONE (OUTPATIENT)
Dept: CARDIOLOGY | Age: 79
End: 2024-02-15

## 2024-02-26 ENCOUNTER — CLINICAL DOCUMENTATION (OUTPATIENT)
Dept: CARDIOLOGY | Age: 79
End: 2024-02-26

## 2024-02-28 RX ORDER — SILDENAFIL CITRATE 20 MG/1
20 TABLET ORAL PRN
Qty: 90 TABLET | Refills: 3 | Status: SHIPPED | OUTPATIENT
Start: 2024-02-28

## 2024-03-07 ENCOUNTER — TELEPHONE (OUTPATIENT)
Dept: CARDIOLOGY | Age: 79
End: 2024-03-07

## 2024-03-08 ENCOUNTER — APPOINTMENT (OUTPATIENT)
Dept: CARDIOLOGY | Age: 79
End: 2024-03-08
Attending: INTERNAL MEDICINE

## 2024-05-21 ENCOUNTER — EXTERNAL RECORD (OUTPATIENT)
Dept: HEALTH INFORMATION MANAGEMENT | Facility: OTHER | Age: 79
End: 2024-05-21

## 2024-05-26 DIAGNOSIS — I48.20 CHRONIC ATRIAL FIBRILLATION  (CMD): ICD-10-CM

## 2024-05-28 RX ORDER — CARVEDILOL 25 MG/1
TABLET ORAL
Qty: 180 TABLET | Refills: 3 | Status: SHIPPED | OUTPATIENT
Start: 2024-05-28

## 2024-05-28 RX ORDER — RIVAROXABAN 20 MG/1
TABLET, FILM COATED ORAL
Qty: 90 TABLET | Refills: 3 | Status: SHIPPED | OUTPATIENT
Start: 2024-05-28

## 2024-05-29 ENCOUNTER — CLINICAL DOCUMENTATION (OUTPATIENT)
Dept: CARDIOLOGY | Age: 79
End: 2024-05-29

## 2024-09-19 ENCOUNTER — TELEPHONE (OUTPATIENT)
Dept: CARDIOLOGY | Age: 79
End: 2024-09-19

## 2024-10-03 ENCOUNTER — EXTERNAL LAB (OUTPATIENT)
Dept: HEALTH INFORMATION MANAGEMENT | Facility: OTHER | Age: 79
End: 2024-10-03

## 2024-10-03 LAB
25(OH)D3+25(OH)D2 SERPL-MCNC: 49 NG/ML (ref 30–100)
ALBUMIN SERPL-MCNC: 4.2 G/DL (ref 3.8–4.8)
ALP SERPL-CCNC: 76 IU/L (ref 44–121)
ALT SERPL-CCNC: 24 IU/L (ref 0–44)
AST SERPL-CCNC: 33 IU/L (ref 0–40)
BASOPHILS # BLD: 0 X10E3/UL (ref 0–0.2)
BASOPHILS NFR BLD: 0 %
BILIRUB SERPL-MCNC: 0.4 MG/DL (ref 0–1.2)
BUN SERPL-MCNC: 28 MG/DL (ref 8–27)
BUN/CREAT SERPL: 26 (ref 10–24)
CALCIUM SERPL-MCNC: 9.3 MG/DL (ref 8.6–10.2)
CHLORIDE SERPL-SCNC: 104 MMOL/L (ref 96–106)
CHOLEST SERPL-MCNC: 211 MG/DL (ref 100–199)
CHOLEST/HDLC SERPL: 3.7 RATIO (ref 0–5)
CO2 SERPL-SCNC: 23 MMOL/L (ref 20–29)
CREAT SERPL-MCNC: 1.08 MG/DL (ref 0.76–1.27)
CRP SERPL HS-MCNC: 0.37 MG/L (ref 0–3)
EOSINOPHIL # BLD: 0.1 X10E3/UL (ref 0–0.4)
EOSINOPHIL NFR BLD: 2 %
ERYTHROCYTE [DISTWIDTH] IN BLOOD: 13.7 % (ref 11.6–15.4)
GFR SERPLBLD SCHWARTZ-ARVRAT: 71 ML/MIN/1.73
GLOBULIN SER-MCNC: 2.7 G/DL (ref 1.5–4.5)
GLUCOSE SERPL-MCNC: 96 MG/DL (ref 70–99)
HCT VFR BLD CALC: 36.7 % (ref 37.5–51)
HDLC SERPL-MCNC: 57 MG/DL
HGB BLD-MCNC: 12.1 G/DL (ref 13–17.7)
IMM GRANULOCYTES # BLD: 0 X10E3/UL (ref 0–0.1)
IMM GRANULOCYTES NFR BLD: 0 %
IRON SERPL-MCNC: 55 UG/DL (ref 38–169)
LAB RESULT: NORMAL
LDH SERPL L TO P-CCNC: 203 IU/L (ref 121–224)
LDLC SERPL CALC-MCNC: 145 MG/DL (ref 0–99)
LENGTH OF FAST TIME PATIENT: YES H
LENGTH OF FAST TIME PATIENT: YES H
LYMPHOCYTES # BLD: 1.4 X10E3/UL (ref 0.7–3.1)
LYMPHOCYTES NFR BLD: 22 %
MCH RBC QN AUTO: 31.6 PG (ref 26.6–33)
MCHC RBC AUTO-ENTMCNC: 33 G/DL (ref 31.5–35.7)
MCV RBC AUTO: 96 FL (ref 79–97)
MONOCYTES # BLD: 0.6 X10E3/UL (ref 0.1–0.9)
MONOCYTES NFR BLD: 9 %
NEUTROPHILS # BLD: 4.1 X10E3/UL (ref 1.4–7)
NEUTROPHILS NFR BLD: 67 %
PHOSPHATE SERPL-MCNC: 4.4 MG/DL (ref 2.8–4.1)
PLATELET # BLD: 204 X10E3/UL (ref 150–450)
POTASSIUM SERPL-SCNC: 4.5 MMOL/L (ref 3.5–5.2)
PROT SERPL-MCNC: 6.9 G/DL (ref 6–8.5)
PSA SERPL-MCNC: 0.5 NG/ML (ref 0–4)
RBC # BLD: 3.83 X10E6/UL (ref 4.14–5.8)
SODIUM SERPL-SCNC: 139 MMOL/L (ref 134–144)
TRIGL SERPL-MCNC: 49 MG/DL (ref 0–149)
URATE SERPL-MCNC: 5.3 MG/DL (ref 3.8–8.4)
VLDLC SERPL CALC-MCNC: 9 MG/DL (ref 5–40)
WBC # BLD: 6.1 X10E3/UL (ref 3.4–10.8)

## 2024-10-09 ENCOUNTER — ANCILLARY PROCEDURE (OUTPATIENT)
Dept: CARDIOLOGY | Age: 79
End: 2024-10-09
Attending: INTERNAL MEDICINE

## 2024-10-09 DIAGNOSIS — R53.83 OTHER FATIGUE: ICD-10-CM

## 2024-10-09 DIAGNOSIS — I48.20 CHRONIC ATRIAL FIBRILLATION  (CMD): ICD-10-CM

## 2024-10-09 DIAGNOSIS — I08.0 MILD MITRAL AND AORTIC REGURGITATION: ICD-10-CM

## 2024-10-09 LAB
AORTIC VALVE AREA (AVA): 0.69
ASCENDING AORTA (AAD): 8
AV PEAK GRADIENT (AVPG): 4
AV PEAK VELOCITY (AVPV): 0.95
AV STENOSIS SEVERITY TEXT: NORMAL
AVI LVOT PEAK GRADIENT (LVOTMG): 1
LEFT INTERNAL DIMENSION IN SYSTOLE (LVSD): 1
LEFT VENTRICULAR INTERNAL DIMENSION IN DIASTOLE (LVDD): 3.8
LEFT VENTRICULAR POSTERIOR WALL IN END DIASTOLE (LVPW): 5.3
LV EF: NORMAL %
LVOT 2D (LVOTD): 12.5
LVOT VTI (LVOTVTI): 0.7
MV PEAK A VELOCITY (MVPAV): 166
RV END SYSTOLIC LONGITUDINAL STRAIN FREE WALL (RVGS): 2.3
TRICUSPID VALVE ANNULAR PEAK VELOCITY (TVAPV): 32
TRICUSPID VALVE PEAK REGURGITATION VELOCITY (TRPV): 3.6

## 2024-10-09 PROCEDURE — 76376 3D RENDER W/INTRP POSTPROCES: CPT | Performed by: INTERNAL MEDICINE

## 2024-10-09 PROCEDURE — 93306 TTE W/DOPPLER COMPLETE: CPT | Performed by: INTERNAL MEDICINE

## 2024-10-09 PROCEDURE — 93356 MYOCRD STRAIN IMG SPCKL TRCK: CPT | Performed by: INTERNAL MEDICINE

## 2024-10-15 PROCEDURE — 93227 XTRNL ECG REC<48 HR R&I: CPT | Performed by: INTERNAL MEDICINE

## 2024-10-24 ENCOUNTER — APPOINTMENT (OUTPATIENT)
Dept: CARDIOLOGY | Age: 79
End: 2024-10-24

## 2024-10-24 VITALS
DIASTOLIC BLOOD PRESSURE: 68 MMHG | SYSTOLIC BLOOD PRESSURE: 133 MMHG | WEIGHT: 150.02 LBS | HEIGHT: 68 IN | BODY MASS INDEX: 22.74 KG/M2 | HEART RATE: 71 BPM

## 2024-10-24 DIAGNOSIS — E78.5 DYSLIPIDEMIA: ICD-10-CM

## 2024-10-24 DIAGNOSIS — I77.4 CELIAC ARTERY STENOSIS (CMD): ICD-10-CM

## 2024-10-24 DIAGNOSIS — I73.9 PERIPHERAL VASCULAR DISEASE (CMD): ICD-10-CM

## 2024-10-24 DIAGNOSIS — R53.83 OTHER FATIGUE: ICD-10-CM

## 2024-10-24 DIAGNOSIS — I08.0 MILD MITRAL AND AORTIC REGURGITATION: ICD-10-CM

## 2024-10-24 DIAGNOSIS — Z79.01 LONG TERM (CURRENT) USE OF ANTICOAGULANTS: ICD-10-CM

## 2024-10-24 DIAGNOSIS — I48.20 CHRONIC ATRIAL FIBRILLATION  (CMD): Primary | ICD-10-CM

## 2024-10-24 DIAGNOSIS — I45.10 RBBB: ICD-10-CM

## 2024-10-24 DIAGNOSIS — I10 ESSENTIAL HYPERTENSION: ICD-10-CM

## 2024-10-24 RX ORDER — HYDROCODONE BITARTRATE AND ACETAMINOPHEN 5; 325 MG/1; MG/1
TABLET ORAL
COMMUNITY
Start: 2024-06-18 | End: 2024-10-24

## 2024-10-24 RX ORDER — METHOCARBAMOL 750 MG/1
TABLET, FILM COATED ORAL
COMMUNITY
Start: 2024-06-18 | End: 2024-10-24

## 2024-10-24 ASSESSMENT — ENCOUNTER SYMPTOMS
FOCAL WEAKNESS: 0
LOSS OF BALANCE: 0
ALLERGIC/IMMUNOLOGIC COMMENTS: NO NEW FOOD ALLERGIES
SUSPICIOUS LESIONS: 0
NEAR-SYNCOPE: 0
DEPRESSION: 0
HALLUCINATIONS: 0
PARESTHESIAS: 0
DOUBLE VISION: 0
WHEEZING: 0
WEIGHT GAIN: 0
BACK PAIN: 1
FEVER: 0
HEMOPTYSIS: 0
COUGH: 0
BRUISES/BLEEDS EASILY: 0
SHORTNESS OF BREATH: 0
WEIGHT LOSS: 0
CHILLS: 0
SYNCOPE: 0
DIZZINESS: 0
HEMATOCHEZIA: 0

## 2024-10-25 ENCOUNTER — TELEPHONE (OUTPATIENT)
Dept: CARDIOLOGY | Age: 79
End: 2024-10-25

## 2024-10-25 DIAGNOSIS — E78.5 DYSLIPIDEMIA: ICD-10-CM

## 2024-10-28 RX ORDER — ROSUVASTATIN CALCIUM 10 MG/1
10 TABLET, COATED ORAL DAILY
Qty: 90 TABLET | Refills: 1 | Status: SHIPPED | OUTPATIENT
Start: 2024-10-28

## 2025-02-06 DIAGNOSIS — E78.5 DYSLIPIDEMIA: ICD-10-CM

## 2025-02-07 RX ORDER — ROSUVASTATIN CALCIUM 10 MG/1
10 TABLET, COATED ORAL DAILY
Qty: 90 TABLET | Refills: 2 | Status: SHIPPED | OUTPATIENT
Start: 2025-02-07

## 2025-02-10 RX ORDER — CARVEDILOL 25 MG/1
TABLET ORAL
Qty: 180 TABLET | Refills: 2 | Status: SHIPPED | OUTPATIENT
Start: 2025-02-10

## 2025-05-11 DIAGNOSIS — I48.20 CHRONIC ATRIAL FIBRILLATION  (CMD): ICD-10-CM

## 2025-05-13 RX ORDER — RIVAROXABAN 20 MG/1
20 TABLET, FILM COATED ORAL
Qty: 90 TABLET | Refills: 2 | Status: SHIPPED | OUTPATIENT
Start: 2025-05-13

## 2025-06-05 RX ORDER — SILDENAFIL CITRATE 20 MG/1
20 TABLET ORAL PRN
Qty: 90 TABLET | Refills: 1 | Status: SHIPPED | OUTPATIENT
Start: 2025-06-05

## (undated) DEVICE — NVM5 MULTIMODALITY SURFACE KIT

## (undated) DEVICE — GAUZE SPONGES,12 PLY: Brand: CURITY

## (undated) DEVICE — PRONEVIEW CUSHION INSERTS LRG

## (undated) DEVICE — PEN: MARKING STD PT 100/CS: Brand: MEDICAL ACTION INDUSTRIES

## (undated) DEVICE — WRAP COOLING BACK W/NO PILLOW

## (undated) DEVICE — GUIDEWIRE, SINGLE SHARP: Brand: MARINER MIS

## (undated) DEVICE — GAMMEX® PI HYBRID SIZE 6, STERILE POWDER-FREE SURGICAL GLOVE, POLYISOPRENE AND NEOPRENE BLEND: Brand: GAMMEX

## (undated) DEVICE — 11.1-DISPOSABLE DILATOR KIT

## (undated) DEVICE — X-RAY DETECTABLE SPONGES,16 PLY: Brand: VISTEC

## (undated) DEVICE — SUTURE VICRYL 2-0 CT-2

## (undated) DEVICE — Device

## (undated) DEVICE — GAMMEX® PI HYBRID SIZE 6.5, STERILE POWDER-FREE SURGICAL GLOVE, POLYISOPRENE AND NEOPRENE BLEND: Brand: GAMMEX

## (undated) DEVICE — GAMMEX® NON-LATEX PI ORTHO SIZE 7, STERILE POLYISOPRENE POWDER-FREE SURGICAL GLOVE: Brand: GAMMEX

## (undated) DEVICE — FLOSEAL HEMOSTATIC MATRIX, 5ML: Brand: FLOSEAL HEMOSTATIC MATRIX

## (undated) DEVICE — DRAPE SRG U 124X80IN U REINF

## (undated) DEVICE — GAMMEX® PI HYBRID SIZE 8.5, STERILE POWDER-FREE SURGICAL GLOVE, POLYISOPRENE AND NEOPRENE BLEND: Brand: GAMMEX

## (undated) DEVICE — 3.0MM PRECISION NEURO (MATCH HEAD)

## (undated) DEVICE — BANDAGE COBAN 5X2 TAN LTX

## (undated) DEVICE — SUTURE VICRYL 0 CT-1

## (undated) DEVICE — GAMMEX® PI HYBRID SIZE 8, STERILE POWDER-FREE SURGICAL GLOVE, POLYISOPRENE AND NEOPRENE BLEND: Brand: GAMMEX

## (undated) DEVICE — KIT PATIENT CARE SPINAL TABLE

## (undated) DEVICE — C-ARMOR C-ARM EQUIPMENT COVERS CLEAR STERILE UNIVERSAL FIT 12 PER CASE: Brand: C-ARMOR

## (undated) DEVICE — NV CLIP DSC&IN-LINE ACTIVATOR

## (undated) DEVICE — DRAPE SRG 90X60IN BCK TBL CVR

## (undated) DEVICE — LAMINECTOMY: Brand: MEDLINE INDUSTRIES, INC.

## (undated) DEVICE — GRAFT DELIVERY SYS MODULE

## (undated) DEVICE — SOL  .9 1000ML BTL

## (undated) DEVICE — SYRINGE BULB 50/CS 48/PLT: Brand: MEDEGEN MEDICAL PRODUCTS, LLC

## (undated) DEVICE — DERMABOND LIQUID ADHESIVE

## (undated) DEVICE — ILLUMINATOR

## (undated) DEVICE — DRAPE SHEET LG

## (undated) NOTE — LETTER
[facility]  Administration of Anesthesia  1.  Fox Guaman., or _________________________________ acting on his behalf, (Patient) (Dependent/Representative) request to receive anesthesia for my pending procedure/operation/treatme spinal bleeding, seizure, cardiac arrest and death. 7. AWARENESS: I understand that it is possible (but unlikely) to have explicit memory of events from the operating room while under general anesthesia.   8. ELECTROCONVULSIVE THERAPY PATIENTS: This consen signature below affirms that prior to the time of the procedure, I have explained to the patient and/or his/her guardian, the risks and benefits of undergoing anesthesia, as well as any reasonable alternatives.     __________________________________________

## (undated) NOTE — LETTER
Kamlesh Joy [address], [place], [place]  [address]  INFORMED CONSENT FOR TRANSFUSION OF BLOOD OR BLOOD PRODUCTS  My physician has informed me of the nature, purpose, benefits and risks of transfusion for blood and blood components that ______________________________________________  (Signature of Patient)                                                            (Responsible party in case of Minor,

## (undated) NOTE — LETTER
Kamlesh Joy [address]  [facility] [address], [place], [place]  [address]  INFORMED CONSENT FOR TRANSFUSION OF BLOOD OR BLOOD PRODUCTS  My physician has informed me of the nature, purpose, benefits and risks of transfusion for blood and blood components that ______________________________________________  (Signature of Patient)                                                            (Responsible party in case of Minor,

## (undated) NOTE — LETTER
[address]  [address] [place], [place]      Authorization for Surgical Operation and Procedure     Date:___________                                                                                                         Time:_______ ill effects as a result of receiving a blood transfusion and/or blood products.   The following are some, but not all, of the potential risks that can occur: fever and allergic reactions, hemolytic reactions, transmission of diseases such as Hepatitis, AIDS unless otherwise explicitly stated by me (or a person authorized to consent on my behalf). The surgeon or my attending physician will determine when the applicable recovery period ends for purposes of reinstating the DNAR order.   10. Patients having a ster significant relationship used in this procedure/surgery, I have disclosed this and had a discussion with my patient.     _______________________________________________________________ _____________________________  Kyle Roblero Physician)

## (undated) NOTE — LETTER
[address] [place] [address]      Authorization for Surgical Operation and Procedure     Date:___________                                                                                                         Time:_______ effects as a result of receiving a blood transfusion and/or blood products.   The following are some, but not all, of the potential risks that can occur: fever and allergic reactions, hemolytic reactions, transmission of diseases such as Hepatitis, AIDS and otherwise explicitly stated by me (or a person authorized to consent on my behalf). The surgeon or my attending physician will determine when the applicable recovery period ends for purposes of reinstating the DNAR order.   10. Patients having a sterilizati relationship used in this procedure/surgery, I have disclosed this and had a discussion with my patient.     _______________________________________________________________ _____________________________  Rebeca Pill of Physician)